# Patient Record
Sex: MALE | Race: WHITE | NOT HISPANIC OR LATINO | Employment: FULL TIME | URBAN - METROPOLITAN AREA
[De-identification: names, ages, dates, MRNs, and addresses within clinical notes are randomized per-mention and may not be internally consistent; named-entity substitution may affect disease eponyms.]

---

## 2019-03-12 ENCOUNTER — OFFICE VISIT (OUTPATIENT)
Dept: FAMILY MEDICINE CLINIC | Facility: CLINIC | Age: 34
End: 2019-03-12
Payer: COMMERCIAL

## 2019-03-12 VITALS
HEIGHT: 69 IN | HEART RATE: 77 BPM | DIASTOLIC BLOOD PRESSURE: 100 MMHG | BODY MASS INDEX: 33.03 KG/M2 | SYSTOLIC BLOOD PRESSURE: 130 MMHG | WEIGHT: 223 LBS | TEMPERATURE: 98 F | OXYGEN SATURATION: 97 %

## 2019-03-12 DIAGNOSIS — H01.02A SQUAMOUS BLEPHARITIS OF BOTH UPPER AND LOWER EYELID OF RIGHT EYE: Primary | ICD-10-CM

## 2019-03-12 DIAGNOSIS — K50.119 CROHN'S DISEASE OF LARGE INTESTINE WITH COMPLICATION (HCC): ICD-10-CM

## 2019-03-12 DIAGNOSIS — I10 ESSENTIAL HYPERTENSION: ICD-10-CM

## 2019-03-12 PROBLEM — K50.90 CROHN'S DISEASE (HCC): Status: ACTIVE | Noted: 2019-03-12

## 2019-03-12 PROCEDURE — 3008F BODY MASS INDEX DOCD: CPT | Performed by: FAMILY MEDICINE

## 2019-03-12 PROCEDURE — 99213 OFFICE O/P EST LOW 20 MIN: CPT | Performed by: FAMILY MEDICINE

## 2019-03-12 RX ORDER — TOBRAMYCIN AND DEXAMETHASONE 3; 1 MG/ML; MG/ML
1 SUSPENSION/ DROPS OPHTHALMIC
Qty: 5 ML | Refills: 1 | Status: SHIPPED | OUTPATIENT
Start: 2019-03-12 | End: 2021-10-07 | Stop reason: ALTCHOICE

## 2019-03-12 RX ORDER — INFLIXIMAB 100 MG/10ML
INJECTION, POWDER, LYOPHILIZED, FOR SOLUTION INTRAVENOUS
COMMUNITY

## 2019-03-12 NOTE — PROGRESS NOTES
Assessment/Plan:       Problem List Items Addressed This Visit        Digestive    Crohn's disease [facility])     Patient has longstanding Crohn's disease he is going in for Remicade infusions regularly and will have his blood pressure recheck that that time if it remains elevated will address with further workup  Cardiovascular and Mediastinum    Essential hypertension     Essential hypertension re-evaluate avoid sodium and follow-up in 3 months if still elevated will address this with potential medication as he has a family history of hypertension  He currently smokes I recommend stop smoking at this point he understands this we will re-evaluate his blood pressure in 3 months            Other    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 - Primary     Conjunctival hyperemia with swelling and erythema of both eyelids right eye present x3 days worsening since a piece of wood hit him in the eye  At this point we will prescribed TobraDex eyedrops and he will call back in 2 days if not improved will refer to Ophthalmology         Relevant Medications    tobramycin-dexamethasone (TOBRADEX) ophthalmic suspension            Subjective:      Patient ID: Deidra Mayes is a 26 y o  male  Patient presents today for swollen right eye no trauma to the area it has been itching and painful since Saturday night for the past 3 days now worsening  He has coming in to have evaluation and treatment for this at this time  Additionally noted at that his blood pressure is elevated likely due to stress and pain  Patient was cutting wood on Saturday and felt a piece of wood his eye      The following portions of the patient's history were reviewed and updated as appropriate: allergies, current medications, past family history, past medical history, past social history, past surgical history and problem list     Review of Systems   Constitutional: Negative for chills, fatigue and fever     HENT: Negative for congestion, nosebleeds, rhinorrhea, sinus pressure and sore throat  Eyes: Negative for discharge and redness  Respiratory: Negative for cough and shortness of breath  Cardiovascular: Negative for chest pain, palpitations and leg swelling  Gastrointestinal: Negative for abdominal pain, blood in stool and nausea  Endocrine: Negative for cold intolerance, heat intolerance and polyuria  Genitourinary: Negative for dysuria and frequency  Musculoskeletal: Negative for arthralgias, back pain and myalgias  Skin: Negative for rash  Neurological: Negative for dizziness, weakness and headaches  Hematological: Negative for adenopathy  Psychiatric/Behavioral: Negative for behavioral problems and sleep disturbance  The patient is not nervous/anxious  Objective:      /100 (BP Location: Left arm, Patient Position: Sitting)   Pulse 77   Temp 98 °F (36 7 °C) (Tympanic)   Ht 5' 9" (1 753 m)   Wt 101 kg (223 lb)   SpO2 97%   BMI 32 93 kg/m²        Physical Exam   Constitutional: He is oriented to person, place, and time  He appears well-developed and well-nourished  HENT:   Head: Normocephalic and atraumatic  Right Ear: External ear normal    Left Ear: External ear normal    Nose: Nose normal    Mouth/Throat: Oropharynx is clear and moist    Eyes: Pupils are equal, round, and reactive to light  Conjunctivae and EOM are normal  No scleral icterus  Right eye inflamed hyperemic conjunctiva with swelling periorbital and over both eyelids   Neck: Normal range of motion  Neck supple  No JVD present  No thyromegaly present  Cardiovascular: Normal rate, regular rhythm and normal heart sounds  No murmur heard  Pulmonary/Chest: Effort normal and breath sounds normal  He has no wheezes  He has no rales  He exhibits no tenderness  Abdominal: Soft  Bowel sounds are normal  He exhibits no distension and no mass  There is no tenderness  There is no rebound and no guarding  Musculoskeletal: Normal range of motion  He exhibits no edema, tenderness or deformity  Lymphadenopathy:     He has no cervical adenopathy  Neurological: He is alert and oriented to person, place, and time  He has normal reflexes  He displays normal reflexes  No cranial nerve deficit  Skin: Skin is warm and dry  No rash noted  No erythema  Psychiatric: He has a normal mood and affect  His behavior is normal  Judgment and thought content normal    Nursing note and vitals reviewed         Data:    Laboratory Results:   Radiology/Other Diagnostic Testing Results:      No results found for: WBC, HGB, HCT, MCV, PLT  No results found for: NA, K, CL, CO2, ANIONGAP, BUN, CREATININE, GLUCOSE, GLUF, CALCIUM, CORRECTEDCA, AST, ALT, ALKPHOS, PROT, BILITOT, EGFR  No results found for: CHOLESTEROL  No results found for: HDL  No results found for: LDLCALC  No results found for: TRIG  No results found for: CHOLHDL  No results found for: [id number], TSH  No results found for: HGBA1C  No results found for: PSA    [facility], DO

## 2019-03-12 NOTE — ASSESSMENT & PLAN NOTE
Essential hypertension re-evaluate avoid sodium and follow-up in 3 months if still elevated will address this with potential medication as he has a family history of hypertension    He currently smokes I recommend stop smoking at this point he understands this we will re-evaluate his blood pressure in 3 months

## 2019-03-12 NOTE — ASSESSMENT & PLAN NOTE
Conjunctival hyperemia with swelling and erythema of both eyelids right eye present x3 days worsening since a piece of wood hit him in the eye    At this point we will prescribed TobraDex eyedrops and he will call back in 2 days if not improved will refer to Ophthalmology

## 2019-03-12 NOTE — ASSESSMENT & PLAN NOTE
Patient has longstanding Crohn's disease he is going in for Remicade infusions regularly and will have his blood pressure recheck that that time if it remains elevated will address with further workup

## 2019-03-12 NOTE — PATIENT INSTRUCTIONS
Hypertension   AMBULATORY CARE:   Hypertension  is high blood pressure (BP)  Your BP is the force of your blood moving against the walls of your arteries  Normal BP is less than 120/80  Prehypertension is between 120/80 and 139/89  Hypertension is 140/90 or higher  Hypertension causes your BP to get so high that your heart has to work much harder than normal  This can damage your heart  You can control hypertension with a healthy lifestyle or medicines  A controlled blood pressure helps protect your organs, such as your heart, lungs, brain, and kidneys  Common symptoms include the following:   · Headache     · Blurred vision     · Chest pain     · Dizziness or weakness     · Trouble breathing    · Nosebleeds  Call 911 for any of the following:   · You have discomfort in your chest that feels like squeezing, pressure, fullness, or pain  · You become confused or have difficulty speaking  · You suddenly feel lightheaded or have trouble breathing  · You have pain or discomfort in your back, neck, jaw, stomach, or arm  Seek care immediately if:   · You have a severe headache or vision loss  · You have weakness in an arm or leg  Contact your healthcare provider if:   · You feel faint, dizzy, confused, or drowsy  · You have been taking your BP medicine and your BP is still higher than your healthcare provider says it should be  · You have questions or concerns about your condition or care  Treatment for hypertension  may include medicine to lower your BP and lower your cholesterol level  A low cholesterol level helps prevent heart disease and makes it easier to control your blood pressure  You may also need to make lifestyle changes  Take your medicine exactly as directed  Manage hypertension:  Talk with your healthcare provider about these and other ways to manage hypertension:  · Check your BP at home  Sit and rest for 5 minutes before you take your BP   Extend your arm and support it on a flat surface  Your arm should be at the same level as your heart  Follow the directions that came with your BP monitor  If possible, take at least 2 BP readings each time  Take your BP at least twice a day at the same times each day, such as morning and evening  Keep a record of your BP readings and bring it to your follow-up visits  Ask your healthcare provider what your BP should be  · Limit sodium (salt) as directed  Too much sodium can affect your fluid balance  Check labels to find low-sodium or no-salt-added foods  Some low-sodium foods use potassium salts for flavor  Too much potassium can also cause health problems  Your healthcare provider will tell you how much sodium and potassium are safe for you to have in a day  He or she may recommend that you limit sodium to 2,300 mg a day  · Follow the meal plan recommended by your healthcare provider  A dietitian or your provider can give you more information on low-sodium plans or the DASH (Dietary Approaches to Stop Hypertension) eating plan  The DASH plan is low in sodium, unhealthy fats, and total fat  It is high in potassium, calcium, and fiber  · Exercise to maintain a healthy weight  Exercise at least 30 minutes per day, on most days of the week  This will help decrease your blood pressure  Ask your healthcare provider about the best exercise plan for you  · Decrease stress  This may help lower your BP  Learn ways to relax, such as deep breathing or listening to music  · Limit alcohol  Women should limit alcohol to 1 drink a day  Men should limit alcohol to 2 drinks a day  A drink of alcohol is 12 ounces of beer, 5 ounces of wine, or 1½ ounces of liquor  · Do not smoke  Nicotine and other chemicals in cigarettes and cigars can increase your BP and also cause lung damage  Ask your healthcare provider for information if you currently smoke and need help to quit  E-cigarettes or smokeless tobacco still contain nicotine  Talk to your healthcare provider before you use these products  · Manage any other health conditions you have  Health conditions such as diabetes can increase your risk for hypertension  Follow your healthcare provider's instructions and take all your medicines as directed  Follow up with your healthcare provider as directed: You will need to return to have your BP checked and to have other lab tests done  Write down your questions so you remember to ask them during your visits  © 2017 [employer] Information is for End User's use only and may not be sold, redistributed or otherwise used for commercial purposes  All illustrations and images included in CareNotes® are the copyrighted property of A D A M [employer] Inc  or [employer]  The above information is an  only  It is not intended as medical advice for individual conditions or treatments  Talk to your doctor, nurse or pharmacist before following any medical regimen to see if it is safe and effective for you

## 2019-07-22 ENCOUNTER — OFFICE VISIT (OUTPATIENT)
Dept: URGENT CARE | Facility: CLINIC | Age: 34
End: 2019-07-22
Payer: COMMERCIAL

## 2019-07-22 VITALS
TEMPERATURE: 98.7 F | WEIGHT: 212 LBS | HEART RATE: 72 BPM | RESPIRATION RATE: 16 BRPM | SYSTOLIC BLOOD PRESSURE: 142 MMHG | BODY MASS INDEX: 31.4 KG/M2 | DIASTOLIC BLOOD PRESSURE: 90 MMHG | OXYGEN SATURATION: 98 % | HEIGHT: 69 IN

## 2019-07-22 DIAGNOSIS — M54.42 ACUTE BILATERAL LOW BACK PAIN WITH LEFT-SIDED SCIATICA: Primary | ICD-10-CM

## 2019-07-22 PROCEDURE — 99213 OFFICE O/P EST LOW 20 MIN: CPT | Performed by: PHYSICIAN ASSISTANT

## 2019-07-22 RX ORDER — METHOCARBAMOL 500 MG/1
500 TABLET, FILM COATED ORAL 3 TIMES DAILY PRN
Qty: 21 TABLET | Refills: 0 | Status: SHIPPED | OUTPATIENT
Start: 2019-07-22 | End: 2020-07-22 | Stop reason: ALTCHOICE

## 2019-07-22 RX ORDER — PREDNISONE 50 MG/1
50 TABLET ORAL DAILY
Qty: 5 TABLET | Refills: 0 | Status: SHIPPED | OUTPATIENT
Start: 2019-07-22 | End: 2019-07-27

## 2019-07-22 NOTE — PROGRESS NOTES
[facility]        NAME: Srikanth Peter is a 37 y o  male  : 1985    MRN: [id number]  DATE: 2019  TIME: 7:15 PM    Assessment and Plan   Acute bilateral low back pain with left-sided sciatica [M54 42]  1  Acute bilateral low back pain with left-sided sciatica  predniSONE 50 mg tablet    methocarbamol (ROBAXIN) 500 mg tablet         Patient Instructions       Follow up with PCP in 3-5 days  Proceed to  ER if symptoms worsen  Chief Complaint     Chief Complaint   Patient presents with    Back Pain     lower back pain x 2 weeks, states he doesn't recall an injury but does have history of herniated discs  Works in construction  Does get shooting pain down L leg         History of Present Illness       47-year-old male presents for evaluation of low back pain  States symptoms began approximately 2 weeks ago  Patient does have a past medical history of sciatica and herniation in the lumbar spine  Patient states he has to see pain management this week however past to reschedule it  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  He states at the pain is in the lower back region today radiating down the left leg  Does not change with position  Denies saddle anesthesia or loss of bowel or bladder function  Review of Systems   Review of Systems   Constitutional: Negative for chills, fatigue and fever  HENT: Negative for congestion, ear pain, sinus pain, sore throat and trouble swallowing  Eyes: Negative for pain, discharge and redness  Respiratory: Negative for cough, chest tightness, shortness of breath and wheezing  Cardiovascular: Negative for chest pain, palpitations and leg swelling  Gastrointestinal: Negative for abdominal pain, diarrhea, nausea and vomiting  Musculoskeletal: Positive for back pain  Negative for arthralgias, joint swelling and myalgias  Skin: Negative for rash     Neurological: Negative for dizziness, weakness, numbness and headaches  Current Medications       Current Outpatient Medications:     inFLIXimab (REMICADE) 100 mg, Infuse into a venous catheter, Disp: , Rfl:     methocarbamol (ROBAXIN) 500 mg tablet, Take 1 tablet (500 mg total) by mouth 3 (three) times a day as needed for muscle spasms for up to 7 days, Disp: 21 tablet, Rfl: 0    predniSONE 50 mg tablet, Take 1 tablet (50 mg total) by mouth daily for 5 days, Disp: 5 tablet, Rfl: 0    tobramycin-dexamethasone (TOBRADEX) ophthalmic suspension, Administer 1 drop to the right eye every 4 (four) hours while awake (Patient not taking: Reported on 7/22/2019), Disp: 5 mL, Rfl: 1    Current Allergies     Allergies as of 07/22/2019    (No Known Allergies)            The following portions of the patient's history were reviewed and updated as appropriate: allergies, current medications, past family history, past medical history, past social history, past surgical history and problem list      Past Medical History:   Diagnosis Date    Crohn's disease ([facility] [address] )     Lumbar herniated disc        History reviewed  No pertinent surgical history  Family History   Problem Relation Age of Onset    Heart attack Father     Diabetes Father     Neuropathy Father     Kidney disease Father          Medications have been verified  Objective   /90   Pulse 72   Temp 98 7 °F (37 1 °C) (Temporal)   Resp 16   Ht 5' 9" (1 753 m)   Wt 96 2 kg (212 lb)   SpO2 98%   BMI 31 31 kg/m²        Physical Exam     Physical Exam   Constitutional: Vital signs are normal  He appears well-developed  No distress  Cardiovascular: Normal rate, regular rhythm, normal heart sounds and intact distal pulses  Pulmonary/Chest: Effort normal and breath sounds normal    Musculoskeletal:        Cervical back: Normal         Thoracic back: Normal         Lumbar back: He exhibits spasm     Positive straight leg

## 2019-07-25 ENCOUNTER — TRANSCRIBE ORDERS (OUTPATIENT)
Dept: ADMINISTRATIVE | Facility: HOSPITAL | Age: 34
End: 2019-07-25

## 2019-07-25 DIAGNOSIS — M54.17 LUMBOSACRAL RADICULOPATHY: Primary | ICD-10-CM

## 2019-08-02 ENCOUNTER — HOSPITAL ENCOUNTER (OUTPATIENT)
Dept: MRI IMAGING | Facility: HOSPITAL | Age: 34
Discharge: HOME | End: 2019-08-02
Attending: ANESTHESIOLOGY
Payer: COMMERCIAL

## 2019-08-02 DIAGNOSIS — M54.17 LUMBOSACRAL RADICULOPATHY: ICD-10-CM

## 2019-08-02 PROCEDURE — 72148 MRI LUMBAR SPINE W/O DYE: CPT

## 2020-07-21 RX ORDER — SODIUM CHLORIDE 9 MG/ML
20 INJECTION, SOLUTION INTRAVENOUS ONCE
Status: COMPLETED | OUTPATIENT
Start: 2020-07-22 | End: 2020-07-22

## 2020-07-21 RX ORDER — DIPHENHYDRAMINE HCL 25 MG
50 TABLET ORAL ONCE
Status: COMPLETED | OUTPATIENT
Start: 2020-07-22 | End: 2020-07-22

## 2020-07-21 RX ORDER — ACETAMINOPHEN 325 MG/1
650 TABLET ORAL ONCE
Status: COMPLETED | OUTPATIENT
Start: 2020-07-22 | End: 2020-07-22

## 2020-07-22 ENCOUNTER — HOSPITAL ENCOUNTER (OUTPATIENT)
Dept: INFUSION CENTER | Facility: HOSPITAL | Age: 35
Discharge: HOME | End: 2020-07-22
Payer: COMMERCIAL

## 2020-07-22 VITALS
HEART RATE: 71 BPM | SYSTOLIC BLOOD PRESSURE: 131 MMHG | DIASTOLIC BLOOD PRESSURE: 77 MMHG | OXYGEN SATURATION: 96 % | RESPIRATION RATE: 18 BRPM | TEMPERATURE: 98.2 F | WEIGHT: 209.22 LBS | BODY MASS INDEX: 30.9 KG/M2

## 2020-07-22 PROCEDURE — 96365 THER/PROPH/DIAG IV INF INIT: CPT

## 2020-07-22 PROCEDURE — 96366 THER/PROPH/DIAG IV INF ADDON: CPT

## 2020-07-22 RX ADMIN — SODIUM CHLORIDE 20 ML/HR: 0.9 INJECTION, SOLUTION INTRAVENOUS at 08:45

## 2020-07-22 RX ADMIN — ACETAMINOPHEN 650 MG: 325 TABLET, FILM COATED ORAL at 08:44

## 2020-07-22 RX ADMIN — DIPHENHYDRAMINE HYDROCHLORIDE 50 MG: 25 TABLET ORAL at 08:44

## 2020-07-22 RX ADMIN — INFLIXIMAB 500 MG: 100 INJECTION, POWDER, LYOPHILIZED, FOR SOLUTION INTRAVENOUS at 09:32

## 2020-08-19 ENCOUNTER — APPOINTMENT (OUTPATIENT)
Dept: LAB | Facility: HOSPITAL | Age: 35
End: 2020-08-19
Payer: COMMERCIAL

## 2020-08-19 ENCOUNTER — TRANSCRIBE ORDERS (OUTPATIENT)
Dept: ADMINISTRATIVE | Facility: HOSPITAL | Age: 35
End: 2020-08-19

## 2020-08-19 DIAGNOSIS — R19.7 DIARRHEA, UNSPECIFIED TYPE: Primary | ICD-10-CM

## 2020-08-19 DIAGNOSIS — K50.919 CROHN'S DISEASE WITH COMPLICATION, UNSPECIFIED GASTROINTESTINAL TRACT LOCATION (HCC): ICD-10-CM

## 2020-08-19 DIAGNOSIS — R19.7 DIARRHEA, UNSPECIFIED TYPE: ICD-10-CM

## 2020-08-19 DIAGNOSIS — K51.919 MILD CHRONIC ULCERATIVE COLITIS WITH COMPLICATION (HCC): ICD-10-CM

## 2020-08-19 LAB
ALBUMIN SERPL BCP-MCNC: 4.3 G/DL (ref 3.4–4.8)
ALP SERPL-CCNC: 87.9 U/L (ref 10–129)
ALT SERPL W P-5'-P-CCNC: 23 U/L (ref 5–63)
ANION GAP SERPL CALCULATED.3IONS-SCNC: 8 MMOL/L (ref 4–13)
AST SERPL W P-5'-P-CCNC: 19 U/L (ref 15–41)
BASOPHILS # BLD AUTO: 0.04 THOUSANDS/ΜL (ref 0–0.1)
BASOPHILS NFR BLD AUTO: 1 % (ref 0–1)
BILIRUB DIRECT SERPL-MCNC: 0.14 MG/DL (ref 0–0.3)
BILIRUB SERPL-MCNC: 0.59 MG/DL (ref 0.3–1.2)
BUN SERPL-MCNC: 11 MG/DL (ref 6–20)
CALCIUM SERPL-MCNC: 9.3 MG/DL (ref 8.4–10.2)
CHLORIDE SERPL-SCNC: 106 MMOL/L (ref 96–108)
CO2 SERPL-SCNC: 25 MMOL/L (ref 22–33)
CREAT SERPL-MCNC: 0.72 MG/DL (ref 0.5–1.2)
CRP SERPL QL: 0.7 MG/L (ref 0–1)
EOSINOPHIL # BLD AUTO: 0.22 THOUSAND/ΜL (ref 0–0.61)
EOSINOPHIL NFR BLD AUTO: 3 % (ref 0–6)
ERYTHROCYTE [DISTWIDTH] IN BLOOD BY AUTOMATED COUNT: 12.2 % (ref 11.6–15.1)
ERYTHROCYTE [SEDIMENTATION RATE] IN BLOOD: 14 MM/HOUR (ref 0–15)
GFR SERPL CREATININE-BSD FRML MDRD: 119 ML/MIN/1.73SQ M
GLUCOSE SERPL-MCNC: 87 MG/DL (ref 65–140)
HCT VFR BLD AUTO: 47.3 % (ref 36.5–49.3)
HGB BLD-MCNC: 16.8 G/DL (ref 12–17)
IMM GRANULOCYTES # BLD AUTO: 0.04 THOUSAND/UL (ref 0–0.2)
IMM GRANULOCYTES NFR BLD AUTO: 1 % (ref 0–2)
LYMPHOCYTES # BLD AUTO: 2.59 THOUSANDS/ΜL (ref 0.6–4.47)
LYMPHOCYTES NFR BLD AUTO: 35 % (ref 14–44)
MCH RBC QN AUTO: 32 PG (ref 26.8–34.3)
MCHC RBC AUTO-ENTMCNC: 35.5 G/DL (ref 31.4–37.4)
MCV RBC AUTO: 90 FL (ref 82–98)
MONOCYTES # BLD AUTO: 0.73 THOUSAND/ΜL (ref 0.17–1.22)
MONOCYTES NFR BLD AUTO: 10 % (ref 4–12)
NEUTROPHILS # BLD AUTO: 3.75 THOUSANDS/ΜL (ref 1.85–7.62)
NEUTS SEG NFR BLD AUTO: 50 % (ref 43–75)
PLATELET # BLD AUTO: 221 THOUSANDS/UL (ref 149–390)
PMV BLD AUTO: 9.3 FL (ref 8.9–12.7)
POTASSIUM SERPL-SCNC: 4.1 MMOL/L (ref 3.5–5)
PROT SERPL-MCNC: 7.5 G/DL (ref 6.4–8.3)
RBC # BLD AUTO: 5.25 MILLION/UL (ref 3.88–5.62)
SODIUM SERPL-SCNC: 139 MMOL/L (ref 133–145)
WBC # BLD AUTO: 7.37 THOUSAND/UL (ref 4.31–10.16)

## 2020-08-19 PROCEDURE — 86140 C-REACTIVE PROTEIN: CPT

## 2020-08-19 PROCEDURE — 85652 RBC SED RATE AUTOMATED: CPT

## 2020-08-19 PROCEDURE — 85025 COMPLETE CBC W/AUTO DIFF WBC: CPT

## 2020-08-19 PROCEDURE — 86480 TB TEST CELL IMMUN MEASURE: CPT

## 2020-08-19 PROCEDURE — 82248 BILIRUBIN DIRECT: CPT

## 2020-08-19 PROCEDURE — 80053 COMPREHEN METABOLIC PANEL: CPT

## 2020-08-19 PROCEDURE — 36415 COLL VENOUS BLD VENIPUNCTURE: CPT

## 2020-08-21 LAB
GAMMA INTERFERON BACKGROUND BLD IA-ACNC: 0.03 IU/ML
M TB IFN-G BLD-IMP: NEGATIVE
M TB IFN-G CD4+ BCKGRND COR BLD-ACNC: 0 IU/ML
M TB IFN-G CD4+ BCKGRND COR BLD-ACNC: 0 IU/ML
MITOGEN IGNF BCKGRD COR BLD-ACNC: >10 IU/ML

## 2020-09-02 ENCOUNTER — HOSPITAL ENCOUNTER (OUTPATIENT)
Dept: INFUSION CENTER | Facility: HOSPITAL | Age: 35
Discharge: HOME | End: 2020-09-02

## 2021-01-08 NOTE — PROGRESS NOTES
Received phone call from [facility] 65 regarding patient's Remicade infusions requesting infusion order is changed to infuse over 2 hours instead of every 3 as previously ordered  Addendum made to paper Rx and faxed to [employer] at [phone]

## 2021-08-26 ENCOUNTER — PREP FOR PROCEDURE (OUTPATIENT)
Dept: GASTROENTEROLOGY | Facility: CLINIC | Age: 36
End: 2021-08-26

## 2021-08-26 DIAGNOSIS — K50.10 CROHN'S DISEASE OF LARGE INTESTINE WITHOUT COMPLICATION (HCC): ICD-10-CM

## 2021-08-26 DIAGNOSIS — Z86.010 HISTORY OF COLON POLYPS: Primary | ICD-10-CM

## 2021-10-07 ENCOUNTER — ANESTHESIA EVENT (OUTPATIENT)
Dept: GASTROENTEROLOGY | Facility: AMBULATORY SURGERY CENTER | Age: 36
End: 2021-10-07

## 2021-10-07 ENCOUNTER — HOSPITAL ENCOUNTER (OUTPATIENT)
Dept: GASTROENTEROLOGY | Facility: AMBULATORY SURGERY CENTER | Age: 36
Discharge: HOME | End: 2021-10-07
Payer: COMMERCIAL

## 2021-10-07 ENCOUNTER — ANESTHESIA (OUTPATIENT)
Dept: GASTROENTEROLOGY | Facility: AMBULATORY SURGERY CENTER | Age: 36
End: 2021-10-07

## 2021-10-07 ENCOUNTER — TELEPHONE (OUTPATIENT)
Dept: GASTROENTEROLOGY | Facility: CLINIC | Age: 36
End: 2021-10-07

## 2021-10-07 VITALS
HEIGHT: 69 IN | WEIGHT: 210 LBS | OXYGEN SATURATION: 100 % | DIASTOLIC BLOOD PRESSURE: 82 MMHG | TEMPERATURE: 98.4 F | SYSTOLIC BLOOD PRESSURE: 127 MMHG | HEART RATE: 63 BPM | BODY MASS INDEX: 31.1 KG/M2 | RESPIRATION RATE: 18 BRPM

## 2021-10-07 DIAGNOSIS — K50.10 CROHN'S DISEASE OF LARGE INTESTINE WITHOUT COMPLICATION (HCC): ICD-10-CM

## 2021-10-07 DIAGNOSIS — Z86.010 HISTORY OF COLON POLYPS: ICD-10-CM

## 2021-10-07 PROCEDURE — 88305 TISSUE EXAM BY PATHOLOGIST: CPT | Performed by: PATHOLOGY

## 2021-10-07 PROCEDURE — 00811 ANES LWR INTST NDSC NOS: CPT | Performed by: NURSE ANESTHETIST, CERTIFIED REGISTERED

## 2021-10-07 PROCEDURE — 45380 COLONOSCOPY AND BIOPSY: CPT | Performed by: INTERNAL MEDICINE

## 2021-10-07 PROCEDURE — 45385 COLONOSCOPY W/LESION REMOVAL: CPT | Performed by: INTERNAL MEDICINE

## 2021-10-07 RX ORDER — SODIUM CHLORIDE 9 MG/ML
INJECTION, SOLUTION INTRAVENOUS CONTINUOUS PRN
Status: DISCONTINUED | OUTPATIENT
Start: 2021-10-07 | End: 2021-10-07

## 2021-10-07 RX ORDER — SODIUM CHLORIDE 9 MG/ML
125 INJECTION, SOLUTION INTRAVENOUS CONTINUOUS
Status: DISCONTINUED | OUTPATIENT
Start: 2021-10-07 | End: 2021-10-11 | Stop reason: HOSPADM

## 2021-10-07 RX ORDER — PROPOFOL 10 MG/ML
INJECTION, EMULSION INTRAVENOUS AS NEEDED
Status: DISCONTINUED | OUTPATIENT
Start: 2021-10-07 | End: 2021-10-07

## 2021-10-07 RX ADMIN — PROPOFOL 150 MG: 10 INJECTION, EMULSION INTRAVENOUS at 12:21

## 2021-10-07 RX ADMIN — SODIUM CHLORIDE: 9 INJECTION, SOLUTION INTRAVENOUS at 12:24

## 2021-10-07 RX ADMIN — PROPOFOL 50 MG: 10 INJECTION, EMULSION INTRAVENOUS at 12:28

## 2021-10-07 RX ADMIN — PROPOFOL 50 MG: 10 INJECTION, EMULSION INTRAVENOUS at 12:27

## 2021-10-21 ENCOUNTER — TELEPHONE (OUTPATIENT)
Dept: GASTROENTEROLOGY | Facility: CLINIC | Age: 36
End: 2021-10-21

## 2022-03-09 ENCOUNTER — DOCUMENTATION (OUTPATIENT)
Dept: GASTROENTEROLOGY | Facility: CLINIC | Age: 37
End: 2022-03-09

## 2022-10-24 ENCOUNTER — OFFICE VISIT (OUTPATIENT)
Dept: URGENT CARE | Facility: CLINIC | Age: 37
End: 2022-10-24
Payer: COMMERCIAL

## 2022-10-24 VITALS
HEART RATE: 62 BPM | HEIGHT: 69 IN | DIASTOLIC BLOOD PRESSURE: 78 MMHG | OXYGEN SATURATION: 97 % | RESPIRATION RATE: 16 BRPM | TEMPERATURE: 98 F | SYSTOLIC BLOOD PRESSURE: 138 MMHG | BODY MASS INDEX: 28.44 KG/M2 | WEIGHT: 192 LBS

## 2022-10-24 DIAGNOSIS — J20.9 ACUTE BRONCHITIS, UNSPECIFIED ORGANISM: Primary | ICD-10-CM

## 2022-10-24 PROCEDURE — 99213 OFFICE O/P EST LOW 20 MIN: CPT | Performed by: PHYSICIAN ASSISTANT

## 2022-10-24 RX ORDER — ALBUTEROL SULFATE 90 UG/1
2 AEROSOL, METERED RESPIRATORY (INHALATION) EVERY 6 HOURS PRN
Qty: 8 G | Refills: 0 | Status: SHIPPED | OUTPATIENT
Start: 2022-10-24

## 2022-10-24 RX ORDER — AZITHROMYCIN 250 MG/1
TABLET, FILM COATED ORAL
Qty: 6 TABLET | Refills: 0 | Status: SHIPPED | OUTPATIENT
Start: 2022-10-24 | End: 2022-10-28

## 2022-10-24 NOTE — PROGRESS NOTES
[id number]        NAME: Jeanie Collet is a 42 y o  male  : 1985    MRN: [id number]  DATE: 2022  TIME: 5:00 PM    Assessment and Plan   Acute bronchitis, unspecified organism [J20 9]  1  Acute bronchitis, unspecified organism  azithromycin (ZITHROMAX) 250 mg tablet    albuterol (Proventil HFA) 90 mcg/act inhaler         Patient Instructions       Follow up with PCP in 3-5 days  Proceed to  ER if symptoms worsen  Chief Complaint     Chief Complaint   Patient presents with   • Cough     1 week persistent cough W/ a lot of mucus  Mucinex  History of Present Illness       Cough  This is a new problem  The current episode started 1 to 4 weeks ago  The problem has been waxing and waning  The problem occurs constantly  The cough is productive of purulent sputum  Associated symptoms include shortness of breath and wheezing  Pertinent negatives include no chest pain, chills, fever, headaches, myalgias or rhinorrhea  The symptoms are aggravated by lying down  He has tried OTC cough suppressant for the symptoms  The treatment provided no relief  There is no history of asthma, COPD or emphysema  Review of Systems   Review of Systems   Constitutional: Negative for chills and fever  HENT: Negative for rhinorrhea  Respiratory: Positive for cough, shortness of breath and wheezing  Cardiovascular: Negative for chest pain  Musculoskeletal: Negative for myalgias  Neurological: Negative for headaches           Current Medications       Current Outpatient Medications:   •  albuterol (Proventil HFA) 90 mcg/act inhaler, Inhale 2 puffs every 6 (six) hours as needed for wheezing or shortness of breath, Disp: 8 g, Rfl: 0  •  azithromycin (ZITHROMAX) 250 mg tablet, Take 2 tablets today then 1 tablet daily x 4 days, Disp: 6 tablet, Rfl: 0  •  inFLIXimab (REMICADE) 100 mg, Infuse into a venous catheter, Disp: , Rfl:     Current Allergies     Allergies as of 10/24/2022   • (No Known Allergies)            The following portions of the patient's history were reviewed and updated as appropriate: allergies, current medications, past family history, past medical history, past social history, past surgical history and problem list      Past Medical History:   Diagnosis Date   • Colon polyp    • Crohn's disease ([address] )    • GERD (gastroesophageal reflux disease)     occasion   • History of Crohn's disease    • Lumbar herniated disc        Past Surgical History:   Procedure Laterality Date   • BACK SURGERY      herniated disc surgery   • COLONOSCOPY     • WISDOM TOOTH EXTRACTION         Family History   Problem Relation Age of Onset   • Heart attack Father    • Diabetes Father    • Neuropathy Father    • Kidney disease Father    • Colon cancer Maternal Uncle          Medications have been verified          Objective   /78   Pulse 62   Temp 98 °F (36 7 °C)   Resp 16   Ht 5' 9" (1 753 m)   Wt 87 1 kg (192 lb)   SpO2 97%   BMI 28 35 kg/m²        Physical Exam     Physical Exam

## 2022-10-24 NOTE — PATIENT INSTRUCTIONS
Acute Bronchitis   WHAT YOU NEED TO KNOW:   Acute bronchitis is swelling and irritation in your lungs  It is usually caused by a virus and most often happens in the winter  Bronchitis may also be caused by bacteria or by a chemical irritant, such as smoke  DISCHARGE INSTRUCTIONS:   Return to the emergency department if:   You cough up blood  Your lips or fingernails turn blue  You feel like you are not getting enough air when you breathe  Call your doctor if:   Your symptoms do not go away or get worse, even after treatment  Your cough does not get better within 4 weeks  You have questions or concerns about your condition or care  Medicines: You may  need any of the following:  Cough suppressants  decrease your urge to cough  Decongestants  help loosen mucus in your lungs and make it easier to cough up  This can help you breathe easier  Inhalers  may be given  Your healthcare provider may give you one or more inhalers to help you breathe easier and cough less  An inhaler gives your medicine to open your airways  Ask your healthcare provider to show you how to use your inhaler correctly  Antibiotics  may be given for up to 5 days if your bronchitis is caused by bacteria  Acetaminophen  decreases pain and fever  It is available without a doctor's order  Ask how much to take and how often to take it  Follow directions  Read the labels of all other medicines you are using to see if they also contain acetaminophen, or ask your doctor or pharmacist  Acetaminophen can cause liver damage if not taken correctly  Do not use more than 4 grams (4,000 milligrams) total of acetaminophen in one day  NSAIDs  help decrease swelling and pain or fever  This medicine is available with or without a doctor's order  NSAIDs can cause stomach bleeding or kidney problems in certain people  If you take blood thinner medicine, always ask your healthcare provider if NSAIDs are safe for you   Always read the medicine label and follow directions  Take your medicine as directed  Contact your healthcare provider if you think your medicine is not helping or if you have side effects  Tell him of her if you are allergic to any medicine  Keep a list of the medicines, vitamins, and herbs you take  Include the amounts, and when and why you take them  Bring the list or the pill bottles to follow-up visits  Carry your medicine list with you in case of an emergency  Self-care:   Drink liquids as directed  You may need to drink more liquids than usual to stay hydrated  Ask how much liquid to drink each day and which liquids are best for you  Use a cool mist humidifier  to increase air moisture in your home  This may make it easier for you to breathe and help decrease your cough  Get more rest   Rest helps your body to heal  Slowly start to do more each day  Rest when you feel it is needed  Avoid irritants in the air  Avoid chemicals, fumes, and dust  Wear a face mask if you must work around dust or fumes  Stay inside on days when air pollution levels are high  If you have allergies, stay inside when pollen counts are high  Do not use aerosol products, such as spray-on deodorant, bug spray, and hair spray  Do not smoke or be around others who are smoking  Nicotine and other chemicals in cigarettes and cigars can cause lung damage  Ask your healthcare provider for information if you currently smoke and need help to quit  E-cigarettes or smokeless tobacco still contain nicotine  Talk to your healthcare provider before you use these products  Prevent acute bronchitis:       Ask about vaccines you may need  Get a flu vaccine each year as soon as recommended, usually in September or October  Ask your healthcare provider if you should also get a pneumonia or COVID-19 vaccine  Your healthcare provider can tell you if you should also get other vaccines, and when to get them  Prevent the spread of germs    You can decrease your risk for acute bronchitis and other illnesses by doing the following:     Wash your hands often with soap and water  Carry germ-killing hand lotion or gel with you  You can use the lotion or gel to clean your hands when soap and water are not available  Do not touch your eyes, nose, or mouth unless you have washed your hands first     Always cover your mouth when you cough to prevent the spread of germs  It is best to cough into a tissue or your shirt sleeve instead of into your hand  Ask those around you to cover their mouths when they cough  Try to avoid people who have a cold or the flu  If you are sick, stay away from others as much as possible  Follow up with your doctor as directed:  Write down questions you have so you will remember to ask them during your follow-up visits  © Copyright [employer] 2022 Information is for End User's use only and may not be sold, redistributed or otherwise used for commercial purposes  All illustrations and images included in CareNotes® are the copyrighted property of [employer]  or Brooke Booker   The above information is an  only  It is not intended as medical advice for individual conditions or treatments  Talk to your doctor, nurse or pharmacist before following any medical regimen to see if it is safe and effective for you

## 2022-11-22 ENCOUNTER — OFFICE VISIT (OUTPATIENT)
Dept: GASTROENTEROLOGY | Facility: CLINIC | Age: 37
End: 2022-11-22

## 2022-11-22 VITALS
DIASTOLIC BLOOD PRESSURE: 96 MMHG | WEIGHT: 207.8 LBS | BODY MASS INDEX: 30.78 KG/M2 | HEART RATE: 70 BPM | HEIGHT: 69 IN | SYSTOLIC BLOOD PRESSURE: 154 MMHG

## 2022-11-22 DIAGNOSIS — K50.10 CROHN'S DISEASE OF LARGE INTESTINE WITHOUT COMPLICATION (HCC): Primary | ICD-10-CM

## 2022-11-22 NOTE — PROGRESS NOTES
[facility] - Outpatient Follow-up Note  Norma Aguero 42 y o  male MRN: [id number]  Encounter: [id number]          ASSESSMENT AND PLAN:      1  Crohn's disease of large intestine without complication (HCC)  -     CBC and differential; Future  -     Comprehensive metabolic panel; Future  -     C-reactive protein; Future  -     Calprotectin,Fecal; Future  -     Giardia antigen; Future  -     Clostridium difficile toxin by PCR; Future  -     INFLIXIMAB LEVEL AND ANTI-DRUG ANTIBODY FOR IBD ([facility]); Future  -     INFLIXIMAB LEVEL AND ANTI-DRUG ANTIBODY FOR IBD ([facility])      History of Crohn's colitis, recent colonoscopy biopsy results noted, had some erythema in the transverse and rectal area, clinically was doing rather well on infliximab infusions 10 milligram/kilogram, recent symptoms of her diarrhea several times a day with urgency cramping, infecting his quality of life, is in construction works outdoors  Had a long discussion with patient and his wife, this could be suboptimal response to reflux mm on maximum dose  Sometimes antibody formation can present like this as well  Will check labs, stool studies, and infliximab antibody  He will probably need to be switched to another biologic/anti TNF drug  DC in pending above information  Complications of Crohn's discussed  ______________________________________________________________________    SUBJECTIVE:      Patient came in for follow-up and evaluation of his longstanding history of Crohn's disease, diagnosed in his teenage years around 14-20  He has been on Remicade infusions for more than 15 years and has been doing well  For the last several months he has noticed more frequent bowel movements, sometimes 8-10 times on a bad day  Denies any blood in stools, does have some urgency cramping with BMs  No reviewed and took Zithromax couple months ago but these diarrhea symptoms has been there before that    No travel no known no one else around him sick  Appetite is fair weight stable energy level so-so has some arthralgias  Denies any dysphagia coughing choking spells, does not bleed or bruise easy  Diet medications more than 10 pertinent systems were reviewed      REVIEW OF SYSTEMS IS OTHERWISE NEGATIVE  Historical Information   Past Medical History:   Diagnosis Date   • Colon polyp    • Crohn's disease ([facility] [address] )    • GERD (gastroesophageal reflux disease)     occasion   • History of Crohn's disease    • Lumbar herniated disc      Past Surgical History:   Procedure Laterality Date   • BACK SURGERY      herniated disc surgery   • COLONOSCOPY     • WISDOM TOOTH EXTRACTION       Social History   Social History     Substance and Sexual Activity   Alcohol Use Yes    Comment: occasional     Social History     Substance and Sexual Activity   Drug Use Not Currently     Social History     Tobacco Use   Smoking Status Every Day   • Packs/day: 1 00   • Types: Cigarettes   Smokeless Tobacco Never     Family History   Problem Relation Age of Onset   • Heart attack Father    • Diabetes Father    • Neuropathy Father    • Kidney disease Father    • Colon cancer Maternal Uncle        Meds/Allergies       Current Outpatient Medications:   •  albuterol (Proventil HFA) 90 mcg/act inhaler  •  inFLIXimab (REMICADE) 100 mg    No Known Allergies        Objective     Blood pressure 154/96, pulse 70, height 5' 9" (1 753 m), weight 94 3 kg (207 lb 12 8 oz)  Body mass index is 30 69 kg/m²  PHYSICAL EXAM:      General Appearance:   Alert, cooperative, no distress   HEENT:   Normocephalic, atraumatic, anicteric  Neck:  Supple, symmetrical, trachea midline   Lungs:   Clear to auscultation bilaterally; no rales, rhonchi or wheezing; respirations unlabored    Heart[de-identified]   Regular rate and rhythm; no murmur     Abdomen:   Soft, non-tender, non-distended; normal bowel sounds; no masses, no organomegaly    Genitalia:   Deferred    Rectal:   Deferred    Extremities: No cyanosis, clubbing or edema    Skin:  No jaundice, rashes, or lesions    Lymph nodes:  No palpable cervical lymphadenopathy        Lab Results:   No visits with results within 1 Day(s) from this visit  Latest known visit with results is:   Hospital Outpatient Visit on 10/07/2021   Component Date Value   • Case Report 10/07/2021                      Value:Surgical Pathology Report                         Case: [id number]                                   Authorizing Provider:  Corinne Situ, MD           Collected:           10/07/2021 1232              Ordering Location:     [address] [place] [address] Received:            10/07/2021 2045                                     [place]                                                                  Pathologist:           Wilbur Manjarrez MD                                                         Specimens:   A) - Rectum, cold bx rectum ulcer hx of crohns                                                      B) - Large Intestine, Sigmoid Colon, cold snare mid sigmoid polyp                         • Final Diagnosis 10/07/2021                      Value: This result contains rich text formatting which cannot be displayed here  • Additional Information 10/07/2021                      Value: This result contains rich text formatting which cannot be displayed here  • Gross Description 10/07/2021                      Value: This result contains rich text formatting which cannot be displayed here  Radiology Results:   No results found

## 2022-11-23 ENCOUNTER — APPOINTMENT (OUTPATIENT)
Dept: LAB | Facility: CLINIC | Age: 37
End: 2022-11-23

## 2022-11-23 DIAGNOSIS — K50.10 CROHN'S DISEASE OF LARGE INTESTINE WITHOUT COMPLICATION (HCC): Primary | ICD-10-CM

## 2022-11-23 LAB
ALBUMIN SERPL BCP-MCNC: 3.5 G/DL (ref 3.5–5)
ALP SERPL-CCNC: 112 U/L (ref 46–116)
ALT SERPL W P-5'-P-CCNC: 38 U/L (ref 12–78)
ANION GAP SERPL CALCULATED.3IONS-SCNC: 8 MMOL/L (ref 4–13)
AST SERPL W P-5'-P-CCNC: 17 U/L (ref 5–45)
BASOPHILS # BLD AUTO: 0.05 THOUSANDS/ÂΜL (ref 0–0.1)
BASOPHILS NFR BLD AUTO: 0 % (ref 0–1)
BILIRUB SERPL-MCNC: 0.4 MG/DL (ref 0.2–1)
BUN SERPL-MCNC: 10 MG/DL (ref 5–25)
CALCIUM SERPL-MCNC: 9.7 MG/DL (ref 8.3–10.1)
CHLORIDE SERPL-SCNC: 109 MMOL/L (ref 96–108)
CO2 SERPL-SCNC: 23 MMOL/L (ref 21–32)
CREAT SERPL-MCNC: 0.81 MG/DL (ref 0.6–1.3)
CRP SERPL QL: 6 MG/L
EOSINOPHIL # BLD AUTO: 0.08 THOUSAND/ÂΜL (ref 0–0.61)
EOSINOPHIL NFR BLD AUTO: 1 % (ref 0–6)
ERYTHROCYTE [DISTWIDTH] IN BLOOD BY AUTOMATED COUNT: 11.8 % (ref 11.6–15.1)
GFR SERPL CREATININE-BSD FRML MDRD: 111 ML/MIN/1.73SQ M
GLUCOSE P FAST SERPL-MCNC: 108 MG/DL (ref 65–99)
HBV SURFACE AG SER QL: NORMAL
HCT VFR BLD AUTO: 44.9 % (ref 36.5–49.3)
HGB BLD-MCNC: 15.3 G/DL (ref 12–17)
IMM GRANULOCYTES # BLD AUTO: 0.05 THOUSAND/UL (ref 0–0.2)
IMM GRANULOCYTES NFR BLD AUTO: 0 % (ref 0–2)
LYMPHOCYTES # BLD AUTO: 3.4 THOUSANDS/ÂΜL (ref 0.6–4.47)
LYMPHOCYTES NFR BLD AUTO: 31 % (ref 14–44)
MCH RBC QN AUTO: 31.7 PG (ref 26.8–34.3)
MCHC RBC AUTO-ENTMCNC: 34.1 G/DL (ref 31.4–37.4)
MCV RBC AUTO: 93 FL (ref 82–98)
MONOCYTES # BLD AUTO: 0.71 THOUSAND/ÂΜL (ref 0.17–1.22)
MONOCYTES NFR BLD AUTO: 6 % (ref 4–12)
NEUTROPHILS # BLD AUTO: 6.83 THOUSANDS/ÂΜL (ref 1.85–7.62)
NEUTS SEG NFR BLD AUTO: 62 % (ref 43–75)
NRBC BLD AUTO-RTO: 0 /100 WBCS
PLATELET # BLD AUTO: 262 THOUSANDS/UL (ref 149–390)
PMV BLD AUTO: 9.4 FL (ref 8.9–12.7)
POTASSIUM SERPL-SCNC: 3.9 MMOL/L (ref 3.5–5.3)
PROT SERPL-MCNC: 8.3 G/DL (ref 6.4–8.4)
RBC # BLD AUTO: 4.82 MILLION/UL (ref 3.88–5.62)
SODIUM SERPL-SCNC: 140 MMOL/L (ref 135–147)
WBC # BLD AUTO: 11.12 THOUSAND/UL (ref 4.31–10.16)

## 2022-11-24 LAB
C DIFF TOX GENS STL QL NAA+PROBE: NEGATIVE
HBV SURFACE AB SER-ACNC: <3.1 MIU/ML

## 2022-11-25 LAB
G LAMBLIA AG STL QL IA: NEGATIVE
GAMMA INTERFERON BACKGROUND BLD IA-ACNC: 0.04 IU/ML
M TB IFN-G BLD-IMP: NEGATIVE
M TB IFN-G CD4+ BCKGRND COR BLD-ACNC: 0 IU/ML
M TB IFN-G CD4+ BCKGRND COR BLD-ACNC: 0 IU/ML
MITOGEN IGNF BCKGRD COR BLD-ACNC: >10 IU/ML

## 2022-11-29 LAB — CALPROTECTIN STL-MCNT: 411 UG/G (ref 0–120)

## 2023-03-15 RX ORDER — INFLIXIMAB 100 MG/10ML
INJECTION, POWDER, LYOPHILIZED, FOR SOLUTION INTRAVENOUS
Qty: 1 EACH | Status: CANCELLED | OUTPATIENT
Start: 2023-03-15

## 2023-03-15 NOTE — TELEPHONE ENCOUNTER
Patients significant other, Elizabeth Campbell, calling in regards to fax and prescription  Medication is due to be given on 3/17/23  Elizabeth Campbell and patient is asking for a phone call when the form is completed and faxed

## 2023-03-15 NOTE — TELEPHONE ENCOUNTER
Spoke to pt, he states [facility] does his infusions, which is odd since we have a contact there and they fax and call us all the time, not sure why we wouldn't have gotten any thing from them, but pt also states he thinks [facility], we had no documentation of this, I did send email out to [facility] Rep

## 2023-03-15 NOTE — TELEPHONE ENCOUNTER
Spoke to Lexie  She gave me Murphy Don at [facility] [phone] and I was able to call in a verbal for pts remicade

## 2023-03-15 NOTE — TELEPHONE ENCOUNTER
Patients GI provider:  Dr Tamera Vidal    Number to return call: [phone]    Reason for call: Pt's wife calling  Given correct Fax #  To [facility] for RX needing signature      Scheduled procedure/appointment date if applicable: Apt/procedure NA

## 2023-03-15 NOTE — TELEPHONE ENCOUNTER
Pablito Mcconnell called because the pharmacy states they have faxed a prescription for the pt remicade to be signed and approved by Dr Vogt Falling  She states she has called several times as well  The pt is due for his infusion on Friday and he gets them done at home  The medication needs to be shipped to the home before the nurse gets there on Friday   Please call either Pablito Mcconnell at [phone] or Larry Henriquez at [phone]

## 2023-07-27 ENCOUNTER — TELEPHONE (OUTPATIENT)
Age: 38
End: 2023-07-27

## 2023-07-27 NOTE — TELEPHONE ENCOUNTER
Last OV 11/22/22  Next OV 9/5 Dr. Manolo Carrizales    Hx Crohn's    Patient switched location from infusion center to [facility] over a year ago for Remicade infusions. Since the switch patient receiving Remicade every 6 weeks excatly. Last infusion 7/21. By week 4 after Remicade patient reports 1. Increase in bowel movement frequency from 5 x day to 6-7 x day with occasional blood on toilet paper  2. Extreme fatigue  3. Joint discomfort in knees and elbows  3. Mild lower abdominal pain that comes and goes. Recommended  Infliximab antibody level (still active from 11/22) to be drawn 1 day prior or morning of next Remicade infusion. Appointment scheduled for 9/5/23.

## 2023-07-27 NOTE — TELEPHONE ENCOUNTER
pts spouse called in regards to two questions. 1. Pt used to have his remicade infusion every 5-8 weeks and it wouldn't be exactly every 6 weeks. pts spouse stated this used to work better because ever since pt started getting his infusion every 6 weeks, he has been benefiting less and less each time. She stated that by the time of his infusion, pt is experiencing increased urgency, bms, and bleeding. Pt and spouse are hoping to have time frame adjusted. 2. Pt wife asked if pt can have raw milk. I advised against that.

## 2023-08-07 ENCOUNTER — OFFICE VISIT (OUTPATIENT)
Dept: GASTROENTEROLOGY | Facility: CLINIC | Age: 38
End: 2023-08-07
Payer: COMMERCIAL

## 2023-08-07 ENCOUNTER — TELEPHONE (OUTPATIENT)
Dept: GASTROENTEROLOGY | Facility: CLINIC | Age: 38
End: 2023-08-07

## 2023-08-07 VITALS
SYSTOLIC BLOOD PRESSURE: 138 MMHG | DIASTOLIC BLOOD PRESSURE: 82 MMHG | HEIGHT: 69 IN | WEIGHT: 198 LBS | HEART RATE: 79 BPM | BODY MASS INDEX: 29.33 KG/M2

## 2023-08-07 DIAGNOSIS — K50.10 CROHN'S DISEASE OF LARGE INTESTINE WITHOUT COMPLICATION (HCC): Primary | ICD-10-CM

## 2023-08-07 DIAGNOSIS — Z86.010 HISTORY OF COLON POLYPS: ICD-10-CM

## 2023-08-07 DIAGNOSIS — Z79.899 ENCOUNTER FOR LONG-TERM (CURRENT) USE OF MEDICATIONS: ICD-10-CM

## 2023-08-07 PROCEDURE — 99214 OFFICE O/P EST MOD 30 MIN: CPT | Performed by: INTERNAL MEDICINE

## 2023-08-07 RX ORDER — POLYETHYLENE GLYCOL-3350 AND ELECTROLYTES 236; 6.74; 5.86; 2.97; 22.74 G/274.31G; G/274.31G; G/274.31G; G/274.31G; G/274.31G
4 POWDER, FOR SOLUTION ORAL ONCE
Qty: 4000 ML | Refills: 0 | Status: SHIPPED | OUTPATIENT
Start: 2023-08-07 | End: 2023-08-19 | Stop reason: SDUPTHER

## 2023-08-07 NOTE — TELEPHONE ENCOUNTER
[address] [facility],[address] Assessment    Name: Francisco Tobias  YOB: 1985  Last Height: 5' 9" (1.753 m)  Last weight: 89.8 kg (198 lb)  BMI: 29.24 kg/m²  Procedure: Colon  Diagnosis:   Date of procedure: 08/22/23  Prep: Golytely  Responsible : Abran Bourne  Phone#: [phone]  Name completing form: Opal Barnhart  Date form completed: 08/07/23      If the patient answers yes to any of these questions, schedule in a hospital  Are you pregnant: No  Do you rely on a wheelchair for mobility: No  Have you been diagnosed with End Stage Renal Disease (ESRD): No  Do you need oxygen during the day: No  Have you had a heart attack or stroke within the past three months: No  Have you had a seizure within the past three months: No  Have you ever been informed by anesthesia that you have a difficult airway: No  Additional Questions  Have you had any cardiac testing or are under the care of a Cardiologist (see cardiac list): No  Cardiac list:   Do you have an implanted cardiac defibrillator: No (Comment:  This patient should be scheduled in the hospital)    Have any bleeding problems, such as anemia or hemophilia (If patient has H&H result below 8, schedule in hospital.  H&H must be within 30 days of procedure): No    Had an organ transplant within the past 3 months: No    Do you have any present infections: No  Do you get short of breath when walking a few blocks: No  Have you been diagnosed with diabetes: No  Comments (provide cardiac provider information if applicable):

## 2023-08-07 NOTE — PROGRESS NOTES
[facility] - Outpatient Follow-up Note  Ngozi Viera 34 y.o. male MRN: [id number]  Encounter: [id number]          ASSESSMENT AND PLAN:      1. Crohn's disease of large intestine without complication ([address])  On chronic infliximab for about 15+ years. Patient was diagnosed around age 10. Some joint aches at around week 5 or so. Due for colonoscopy. - Colonoscopy; Future  - C-reactive protein; Future  - Comprehensive metabolic panel; Future  - CBC; Future  - Infliximab Concentration and Anti-Infliximab Antibody([facility]); Future  - polyethylene glycol (GaviLyte-G) 4000 mL solution; Take 4,000 mL by mouth once for 1 dose  Dispense: 4000 mL; Refill: 0    2. History of colon polyps  Due for colonoscopy  - Colonoscopy; Future  - C-reactive protein; Future  - Comprehensive metabolic panel; Future  - CBC; Future  - polyethylene glycol (GaviLyte-G) 4000 mL solution; Take 4,000 mL by mouth once for 1 dose  Dispense: 4000 mL; Refill: 0    3. Encounter for long-term (current) use of medications  Infliximab 15 years 10 mg/kg every 6 weeks some breakthrough after for 5 weeks. He will otherwise follow-up in 3 to 4 months  - C-reactive protein; Future  - Comprehensive metabolic panel; Future  - CBC; Future  - Infliximab Concentration and Anti-Infliximab Antibody([facility]); Future  - polyethylene glycol (GaviLyte-G) 4000 mL solution; Take 4,000 mL by mouth once for 1 dose  Dispense: 4000 mL; Refill: 0    ______________________________________________________________________    SUBJECTIVE: Very pleasant gentleman 34years of age long-term infliximab for Crohn's disease large intestine followed by Dr. Alma Sales.  Does fairly well but experiences some joint pains and some mild breakthrough symptoms after week 4 or 5. He gets his infliximab every 6 weeks 10 mg/kg. Tolerates it well. No travel out of the country. No fevers chills night sweats abdominal pain.   Works full-time job in construction. Does smoke. REVIEW OF SYSTEMS IS OTHERWISE NEGATIVE. Historical Information   Past Medical History:   Diagnosis Date   • Colon polyp    • Crohn's disease ([address])    • GERD (gastroesophageal reflux disease)     occasion   • History of Crohn's disease    • Lumbar herniated disc      Past Surgical History:   Procedure Laterality Date   • BACK SURGERY      herniated disc surgery   • COLONOSCOPY     • WISDOM TOOTH EXTRACTION       Social History   Social History     Substance and Sexual Activity   Alcohol Use Yes    Comment: occasional     Social History     Substance and Sexual Activity   Drug Use Not Currently     Social History     Tobacco Use   Smoking Status Every Day   • Packs/day: 1.00   • Types: Cigarettes   Smokeless Tobacco Never     Family History   Problem Relation Age of Onset   • Heart attack Father    • Diabetes Father    • Neuropathy Father    • Kidney disease Father    • Colon cancer Maternal Uncle        Meds/Allergies       Current Outpatient Medications:   •  inFLIXimab (REMICADE) 100 mg  •  polyethylene glycol (GaviLyte-G) 4000 mL solution  •  albuterol (Proventil HFA) 90 mcg/act inhaler    No Known Allergies        Objective     Blood pressure 138/82, pulse 79, height 5' 9" (1.753 m), weight 89.8 kg (198 lb). Body mass index is 29.24 kg/m². PHYSICAL EXAM:      General Appearance:   Alert, cooperative, no distress   HEENT:   Normocephalic, atraumatic, anicteric.     Neck:  Supple, symmetrical, trachea midline   Lungs:   Clear to auscultation bilaterally; no rales, rhonchi or wheezing; respirations unlabored    Heart[de-identified]   Regular rate and rhythm; no murmur, rub, or gallop.    Abdomen:   Soft, non-tender, non-distended; normal bowel sounds; no masses, no organomegaly    Genitalia:   Deferred    Rectal:   Deferred    Extremities:  No cyanosis, clubbing or edema    Pulses:  2+ and symmetric    Skin:  No jaundice, rashes, or lesions    Lymph nodes:  No palpable cervical lymphadenopathy        Lab Results:   No visits with results within 1 Day(s) from this visit. Latest known visit with results is:   Appointment on 11/23/2022   Component Date Value   • WBC 11/23/2022 11.12 (H)    • RBC 11/23/2022 4.82    • Hemoglobin 11/23/2022 15.3    • Hematocrit 11/23/2022 44.9    • MCV 11/23/2022 93    • MCH 11/23/2022 31.7    • MCHC 11/23/2022 34.1    • RDW 11/23/2022 11.8    • MPV 11/23/2022 9.4    • Platelets 62/08/2199 262    • nRBC 11/23/2022 0    • Neutrophils Relative 11/23/2022 62    • Immat GRANS % 11/23/2022 0    • Lymphocytes Relative 11/23/2022 31    • Monocytes Relative 11/23/2022 6    • Eosinophils Relative 11/23/2022 1    • Basophils Relative 11/23/2022 0    • Neutrophils Absolute 11/23/2022 6.83    • Immature Grans Absolute 11/23/2022 0.05    • Lymphocytes Absolute 11/23/2022 3.40    • Monocytes Absolute 11/23/2022 0.71    • Eosinophils Absolute 11/23/2022 0.08    • Basophils Absolute 11/23/2022 0.05    • Sodium 11/23/2022 140    • Potassium 11/23/2022 3.9    • Chloride 11/23/2022 109 (H)    • CO2 11/23/2022 23    • ANION GAP 11/23/2022 8    • BUN 11/23/2022 10    • Creatinine 11/23/2022 0.81    • Glucose, Fasting 11/23/2022 108 (H)    • Calcium 11/23/2022 9.7    • AST 11/23/2022 17    • ALT 11/23/2022 38    • Alkaline Phosphatase 11/23/2022 112    • Total Protein 11/23/2022 8.3    • Albumin 11/23/2022 3.5    • Total Bilirubin 11/23/2022 0.40    • eGFR 11/23/2022 111    • CRP 11/23/2022 6.0 (H)    • Hep B S Ab 11/23/2022 <3.10    • QFT Nil 11/23/2022 0.04    • QFT TB1-NIL 11/23/2022 0.00    • QFT TB2-NIL 11/23/2022 0.00    • QFT Mitogen-NIL 11/23/2022 >10.00    • QFT Final Interpretation 11/23/2022 Negative    • Hepatitis B Surface Ag 11/23/2022 Non-reactive    • Calprotectin 11/23/2022 411 (H)    • Giardia Ag, Stl 11/23/2022 Negative    • C.difficile toxin by PCR 11/23/2022 Negative          Radiology Results:   No results found.

## 2023-08-07 NOTE — TELEPHONE ENCOUNTER
Scheduled date of colonoscopy (as of today):08/22/23  Physician performing colonoscopy:Dr. Jonas  Location of colonoscopy:[facility]  Bowel prep reviewed with patient:Pau  Instructions reviewed with patient by:liang  Clearances: n/a

## 2023-08-11 ENCOUNTER — TELEPHONE (OUTPATIENT)
Age: 38
End: 2023-08-11

## 2023-08-11 NOTE — TELEPHONE ENCOUNTER
Patients GI provider:  Dr. Taye Otoole    Number to return call: [phone]     Reason for call: Yuniel Pizano from [facility]  calling requesting to speak with someone regarding renewal of prior auth for patient's remecade infusion. Ynuiel Pizano requesting for  Clinical office notes, appts and anything that can support the prior auth. fax to [phone]     Scheduled procedure/appointment date if applicable: Apt/procedure

## 2023-08-14 NOTE — TELEPHONE ENCOUNTER
Faxed office note from 8/7/23 to Angel Garner at [facility] [phone] so they can reauth the Remicade infusion.

## 2023-08-15 ENCOUNTER — OFFICE VISIT (OUTPATIENT)
Dept: FAMILY MEDICINE CLINIC | Facility: CLINIC | Age: 38
End: 2023-08-15
Payer: COMMERCIAL

## 2023-08-15 VITALS
SYSTOLIC BLOOD PRESSURE: 130 MMHG | HEART RATE: 74 BPM | HEIGHT: 69 IN | RESPIRATION RATE: 18 BRPM | OXYGEN SATURATION: 96 % | TEMPERATURE: 98.8 F | DIASTOLIC BLOOD PRESSURE: 78 MMHG | BODY MASS INDEX: 29.06 KG/M2 | WEIGHT: 196.2 LBS

## 2023-08-15 DIAGNOSIS — I10 ESSENTIAL HYPERTENSION: ICD-10-CM

## 2023-08-15 DIAGNOSIS — Z00.00 ANNUAL PHYSICAL EXAM: ICD-10-CM

## 2023-08-15 DIAGNOSIS — K50.119 CROHN'S DISEASE OF LARGE INTESTINE WITH COMPLICATION (HCC): ICD-10-CM

## 2023-08-15 DIAGNOSIS — M25.50 POLYARTHRALGIA: Primary | ICD-10-CM

## 2023-08-15 PROCEDURE — 99386 PREV VISIT NEW AGE 40-64: CPT | Performed by: FAMILY MEDICINE

## 2023-08-15 NOTE — PROGRESS NOTES
BMI Counseling: Body mass index is 28.97 kg/m². The BMI is above normal. Nutrition recommendations include reducing portion sizes, 3-5 servings of fruits/vegetables daily, reducing fast food intake, decreasing soda and/or juice intake, moderation in carbohydrate intake, increasing intake of lean protein and reducing intake of saturated fat and trans fat. Exercise recommendations include exercising 3-5 times per week. ADULT ANNUAL PHYSICAL  [facility]    NAME: Malorie Carpenter  AGE: 34 y.o. SEX: male  : 1985     DATE: 8/15/2023     Assessment and Plan:     Problem List Items Addressed This Visit        Cardiovascular and Mediastinum    Essential hypertension     Central hypertension under stable control off medication patient will avoid sodium work on daily exercise and maintain same weight control            Other    Crohn's disease ([address])     Crohn's disease stable with Remicade continue to follow-up closely with gastroenterology         Polyarthralgia - Primary     Check Lyme titer now this also could be related to Remicade or Crohn's itself. Patient will be followed up by his gastroenterologist.  Crohn's has been under stable control recently with the Remicade. Additionally check all lab work including C-reactive protein         Relevant Orders    C-reactive protein    Lyme Disease Serology w/Reflex    Annual physical exam    Relevant Orders    C-reactive protein    Lyme Disease Serology w/Reflex    CBC and differential    Comprehensive metabolic panel    Lipid panel    TSH, 3rd generation with Free T4 reflex    PSA, total and free       Immunizations and preventive care screenings were discussed with patient today. Appropriate education was printed on patient's after visit summary. Discussed risks and benefits of prostate cancer screening.  We discussed the controversial history of PSA screening for prostate cancer in the [facility] as well as the risk of over detection and over treatment of prostate cancer by way of PSA screening. The patient understands that PSA blood testing is an imperfect way to screen for prostate cancer and that elevated PSA levels in the blood may also be caused by infection, inflammation, prostatic trauma or manipulation, urological procedures, or by benign prostatic enlargement. The role of the digital rectal examination in prostate cancer screening was also discussed and I discussed with him that there is large interobserver variability in the findings of digital rectal examination. Counseling:  Alcohol/drug use: discussed moderation in alcohol intake, the recommendations for healthy alcohol use, and avoidance of illicit drug use. Dental Health: discussed importance of regular tooth brushing, flossing, and dental visits. Injury prevention: discussed safety/seat belts, safety helmets, smoke detectors, carbon dioxide detectors, and smoking near bedding or upholstery. Sexual health: discussed sexually transmitted diseases, partner selection, use of condoms, avoidance of unintended pregnancy, and contraceptive alternatives. · Exercise: the importance of regular exercise/physical activity was discussed. Recommend exercise 3-5 times per week for at least 30 minutes. Return in about 1 year (around 8/15/2024) for [address] [place] [address], Annual physical.     Chief Complaint:     Chief Complaint   Patient presents with   • Establish Care   • Physical Exam      History of Present Illness:     Adult Annual Physical   Patient here for a comprehensive physical exam. The patient reports no problems. Diet and Physical Activity  · Diet/Nutrition: well balanced diet. · Exercise: no formal exercise.       Depression Screening  PHQ-2/9 Depression Screening    Little interest or pleasure in doing things: 0 - not at all  Feeling down, depressed, or hopeless: 0 - not at all  PHQ-2 Score: 0  PHQ-2 Interpretation: Negative depression screen       General Health  · Sleep: sleeps well. · Hearing: normal - bilateral.  · Vision: no vision problems. · Dental: regular dental visits.  Health  · Symptoms include: none     Review of Systems:     Review of Systems   Constitutional: Negative for chills, fatigue and fever. HENT: Negative for congestion, nosebleeds, rhinorrhea, sinus pressure and sore throat. Eyes: Negative for discharge and redness. Respiratory: Negative for cough and shortness of breath. Cardiovascular: Negative for chest pain, palpitations and leg swelling. Gastrointestinal: Negative for abdominal pain, blood in stool and nausea. Endocrine: Negative for cold intolerance, heat intolerance and polyuria. Genitourinary: Negative for dysuria and frequency. Musculoskeletal: Negative for arthralgias, back pain and myalgias. Skin: Negative for rash. Neurological: Negative for dizziness, weakness and headaches. Hematological: Negative for adenopathy. Psychiatric/Behavioral: Negative for behavioral problems and sleep disturbance. The patient is not nervous/anxious.        Past Medical History:     Past Medical History:   Diagnosis Date   • Colon polyp    • Crohn's disease ([address])    • GERD (gastroesophageal reflux disease)     occasion   • History of Crohn's disease    • Lumbar herniated disc       Past Surgical History:     Past Surgical History:   Procedure Laterality Date   • BACK SURGERY      herniated disc surgery   • COLONOSCOPY     • WISDOM TOOTH EXTRACTION        Family History:     Family History   Problem Relation Age of Onset   • Heart attack Father    • Diabetes Father    • Neuropathy Father    • Kidney disease Father    • Colon cancer Maternal Uncle       Social History:     Social History     Socioeconomic History   • Marital status: Single     Spouse name: None   • Number of children: None   • Years of education: None   • Highest education level: None   Occupational History   • None   Tobacco Use • Smoking status: Every Day     Packs/day: 1.00     Types: Cigarettes   • Smokeless tobacco: Never   Vaping Use   • Vaping Use: None   Substance and Sexual Activity   • Alcohol use: Yes     Comment: occasional   • Drug use: Not Currently   • Sexual activity: None   Other Topics Concern   • None   Social History Narrative   • None     Social Determinants of Health     Financial Resource Strain: Not on file   Food Insecurity: Not on file   Transportation Needs: Not on file   Physical Activity: Not on file   Stress: Not on file   Social Connections: Not on file   Intimate Partner Violence: Not on file   Housing Stability: Not on file      Current Medications:     Current Outpatient Medications   Medication Sig Dispense Refill   • inFLIXimab (REMICADE) 100 mg Infuse into a venous catheter     • polyethylene glycol (GaviLyte-G) 4000 mL solution Take 4,000 mL by mouth once for 1 dose 4000 mL 0   • albuterol (Proventil HFA) 90 mcg/act inhaler Inhale 2 puffs every 6 (six) hours as needed for wheezing or shortness of breath (Patient not taking: Reported on 8/15/2023) 8 g 0     No current facility-administered medications for this visit. Allergies:     No Known Allergies   Physical Exam:     /78 (BP Location: Right arm, Patient Position: Sitting, Cuff Size: Large)   Pulse 74   Temp 98.8 °F (37.1 °C) (Tympanic)   Resp 18   Ht 5' 9" (1.753 m)   Wt 89 kg (196 lb 3.2 oz)   SpO2 96%   BMI 28.97 kg/m²     Physical Exam  Vitals and nursing note reviewed. Constitutional:       General: He is not in acute distress. Appearance: Normal appearance. He is well-developed and normal weight. HENT:      Head: Normocephalic and atraumatic. Right Ear: Tympanic membrane, ear canal and external ear normal.      Left Ear: Tympanic membrane, ear canal and external ear normal.      Nose: Nose normal.      Mouth/Throat:      Mouth: Mucous membranes are moist.      Pharynx: Oropharynx is clear.    Eyes:      Extraocular Movements: Extraocular movements intact. Conjunctiva/sclera: Conjunctivae normal.   Cardiovascular:      Rate and Rhythm: Normal rate and regular rhythm. Pulses: Normal pulses. Heart sounds: Normal heart sounds. No murmur heard. Pulmonary:      Effort: Pulmonary effort is normal. No respiratory distress. Breath sounds: Normal breath sounds. Abdominal:      General: Bowel sounds are normal.      Palpations: Abdomen is soft. Tenderness: There is no abdominal tenderness. Musculoskeletal:         General: No swelling. Normal range of motion. Cervical back: Normal range of motion and neck supple. Skin:     General: Skin is warm and dry. Capillary Refill: Capillary refill takes less than 2 seconds. Neurological:      General: No focal deficit present. Mental Status: He is alert and oriented to person, place, and time. Mental status is at baseline. Psychiatric:         Mood and Affect: Mood normal.         Behavior: Behavior normal.         Thought Content:  Thought content normal.         Judgment: Judgment normal.          [facility] O'Michael, DO  [address] [facility]

## 2023-08-15 NOTE — ASSESSMENT & PLAN NOTE
Central hypertension under stable control off medication patient will avoid sodium work on daily exercise and maintain same weight control

## 2023-08-16 ENCOUNTER — TELEPHONE (OUTPATIENT)
Dept: GASTROENTEROLOGY | Facility: CLINIC | Age: 38
End: 2023-08-16

## 2023-08-16 NOTE — TELEPHONE ENCOUNTER
lmom confirming pt's colonoscopy scheduled on 8/22/23 at [facility] ER with Dr Bonifacio Bain.  Informed [facility] would be calling 1-2 days prior with the arrival time. Informed of clear liquid diet day prior as well as the bowel cleansing preparation. Informed would need a  the day of the procedure due to being under sedation. I asked pt to please call back if has not received instructions or if has any questions.

## 2023-08-19 ENCOUNTER — NURSE TRIAGE (OUTPATIENT)
Dept: OTHER | Facility: OTHER | Age: 38
End: 2023-08-19

## 2023-08-19 DIAGNOSIS — Z86.010 HISTORY OF COLON POLYPS: ICD-10-CM

## 2023-08-19 DIAGNOSIS — K50.10 CROHN'S DISEASE OF LARGE INTESTINE WITHOUT COMPLICATION (HCC): ICD-10-CM

## 2023-08-19 DIAGNOSIS — Z79.899 ENCOUNTER FOR LONG-TERM (CURRENT) USE OF MEDICATIONS: ICD-10-CM

## 2023-08-19 RX ORDER — POLYETHYLENE GLYCOL-3350 AND ELECTROLYTES 236; 6.74; 5.86; 2.97; 22.74 G/274.31G; G/274.31G; G/274.31G; G/274.31G; G/274.31G
4 POWDER, FOR SOLUTION ORAL ONCE
Qty: 4000 ML | Refills: 0 | Status: SHIPPED | OUTPATIENT
Start: 2023-08-19 | End: 2023-08-21 | Stop reason: SDUPTHER

## 2023-08-19 NOTE — TELEPHONE ENCOUNTER
Reason for Disposition  • [1] Prescription prescribed recently is not at pharmacy AND [2] triager has access to patient's EMR AND [3] prescription is recorded in the EMR    Answer Assessment - Initial Assessment Questions  1. NAME of MEDICATION: "What medicine are you calling about?"      Golytely   2. QUESTION: "What is your question?" (e.g., medication refill, side effect)     I don't have it. If it was ordered for a mail order I didn't get it. Can I pick that up locally? 3. PRESCRIBING HCP: "Who prescribed it?" Reason: if prescribed by specialist, call should be referred to that group. GI  4. SYMPTOMS: "Do you have any symptoms?"      Having colonoscopy on Tuesday and needs to do prep  5.  SEVERITY: If symptoms are present, ask "Are they mild, moderate or severe?"     n/a    Protocols used: MEDICATION QUESTION CALL-ADULT-

## 2023-08-19 NOTE — TELEPHONE ENCOUNTER
Pt states he thinks his Golytely prep was ordered through a mail order pharmacy and he did not receive it. Requesting reorder through [employer] in [place]. Reorder completed.

## 2023-08-19 NOTE — TELEPHONE ENCOUNTER
Regarding: Colonoscopy Bowel Prep not sent to Pharmacy  ----- Message from Mckay Shearer sent at 8/19/2023 10:34 AM EDT -----  " My  is having a Colonoscopy on Tuesday and he has not gotten his Bowel Prep sent to the Pharmacy . "

## 2023-08-19 NOTE — TELEPHONE ENCOUNTER
Regarding: Colonoscopy Bowel Prep not sent to Pharmacy  ----- Message from Jame Soulier sent at 8/19/2023 12:36 PM EDT -----  Patients wife called back stating no one called them regarding the colonoscopy. I mentioned that maybe they both have call block but she states they do not. Please try both numbers in chart    My  is having a Colonoscopy on Tuesday and he has not gotten his Bowel Prep sent to the Pharmacy . "       Colonoscopy Bowel Prep not sent to Pharmacy

## 2023-08-21 ENCOUNTER — LAB (OUTPATIENT)
Dept: LAB | Facility: MEDICAL CENTER | Age: 38
End: 2023-08-21
Payer: COMMERCIAL

## 2023-08-21 DIAGNOSIS — K50.10 CROHN'S DISEASE OF LARGE INTESTINE WITHOUT COMPLICATION (HCC): ICD-10-CM

## 2023-08-21 DIAGNOSIS — M25.50 POLYARTHRALGIA: ICD-10-CM

## 2023-08-21 DIAGNOSIS — Z86.010 HISTORY OF COLON POLYPS: ICD-10-CM

## 2023-08-21 DIAGNOSIS — Z79.899 ENCOUNTER FOR LONG-TERM (CURRENT) USE OF MEDICATIONS: ICD-10-CM

## 2023-08-21 DIAGNOSIS — Z00.00 ANNUAL PHYSICAL EXAM: ICD-10-CM

## 2023-08-21 LAB
ALBUMIN SERPL BCP-MCNC: 3.6 G/DL (ref 3.5–5)
ALP SERPL-CCNC: 105 U/L (ref 46–116)
ALT SERPL W P-5'-P-CCNC: 25 U/L (ref 12–78)
ANION GAP SERPL CALCULATED.3IONS-SCNC: 6 MMOL/L
AST SERPL W P-5'-P-CCNC: 15 U/L (ref 5–45)
B BURGDOR IGG+IGM SER QL IA: NEGATIVE
BASOPHILS # BLD AUTO: 0.03 THOUSANDS/ÂΜL (ref 0–0.1)
BASOPHILS NFR BLD AUTO: 0 % (ref 0–1)
BILIRUB SERPL-MCNC: 0.5 MG/DL (ref 0.2–1)
BUN SERPL-MCNC: 8 MG/DL (ref 5–25)
CALCIUM SERPL-MCNC: 9.3 MG/DL (ref 8.3–10.1)
CHLORIDE SERPL-SCNC: 110 MMOL/L (ref 96–108)
CHOLEST SERPL-MCNC: 144 MG/DL
CO2 SERPL-SCNC: 24 MMOL/L (ref 21–32)
CREAT SERPL-MCNC: 0.89 MG/DL (ref 0.6–1.3)
CRP SERPL QL: 14.1 MG/L
EOSINOPHIL # BLD AUTO: 0.13 THOUSAND/ÂΜL (ref 0–0.61)
EOSINOPHIL NFR BLD AUTO: 2 % (ref 0–6)
ERYTHROCYTE [DISTWIDTH] IN BLOOD BY AUTOMATED COUNT: 12.6 % (ref 11.6–15.1)
GFR SERPL CREATININE-BSD FRML MDRD: 106 ML/MIN/1.73SQ M
GLUCOSE P FAST SERPL-MCNC: 115 MG/DL (ref 65–99)
HCT VFR BLD AUTO: 47.3 % (ref 36.5–49.3)
HDLC SERPL-MCNC: 45 MG/DL
HGB BLD-MCNC: 15.2 G/DL (ref 12–17)
IMM GRANULOCYTES # BLD AUTO: 0.03 THOUSAND/UL (ref 0–0.2)
IMM GRANULOCYTES NFR BLD AUTO: 0 % (ref 0–2)
LDLC SERPL CALC-MCNC: 79 MG/DL (ref 0–100)
LYMPHOCYTES # BLD AUTO: 2.21 THOUSANDS/ÂΜL (ref 0.6–4.47)
LYMPHOCYTES NFR BLD AUTO: 28 % (ref 14–44)
MCH RBC QN AUTO: 31 PG (ref 26.8–34.3)
MCHC RBC AUTO-ENTMCNC: 32.1 G/DL (ref 31.4–37.4)
MCV RBC AUTO: 97 FL (ref 82–98)
MONOCYTES # BLD AUTO: 0.91 THOUSAND/ÂΜL (ref 0.17–1.22)
MONOCYTES NFR BLD AUTO: 11 % (ref 4–12)
NEUTROPHILS # BLD AUTO: 4.71 THOUSANDS/ÂΜL (ref 1.85–7.62)
NEUTS SEG NFR BLD AUTO: 59 % (ref 43–75)
NONHDLC SERPL-MCNC: 99 MG/DL
NRBC BLD AUTO-RTO: 0 /100 WBCS
PLATELET # BLD AUTO: 222 THOUSANDS/UL (ref 149–390)
PMV BLD AUTO: 9.4 FL (ref 8.9–12.7)
POTASSIUM SERPL-SCNC: 3.8 MMOL/L (ref 3.5–5.3)
PROT SERPL-MCNC: 7.7 G/DL (ref 6.4–8.4)
RBC # BLD AUTO: 4.9 MILLION/UL (ref 3.88–5.62)
SODIUM SERPL-SCNC: 140 MMOL/L (ref 135–147)
TRIGL SERPL-MCNC: 101 MG/DL
TSH SERPL DL<=0.05 MIU/L-ACNC: 0.97 UIU/ML (ref 0.45–4.5)
WBC # BLD AUTO: 8.02 THOUSAND/UL (ref 4.31–10.16)

## 2023-08-21 PROCEDURE — 82397 CHEMILUMINESCENT ASSAY: CPT

## 2023-08-21 PROCEDURE — 84153 ASSAY OF PSA TOTAL: CPT

## 2023-08-21 PROCEDURE — 80061 LIPID PANEL: CPT

## 2023-08-21 PROCEDURE — 84154 ASSAY OF PSA FREE: CPT

## 2023-08-21 PROCEDURE — 86618 LYME DISEASE ANTIBODY: CPT

## 2023-08-21 PROCEDURE — 84443 ASSAY THYROID STIM HORMONE: CPT

## 2023-08-21 PROCEDURE — 80053 COMPREHEN METABOLIC PANEL: CPT

## 2023-08-21 PROCEDURE — 86140 C-REACTIVE PROTEIN: CPT

## 2023-08-21 PROCEDURE — 36415 COLL VENOUS BLD VENIPUNCTURE: CPT

## 2023-08-21 PROCEDURE — 85025 COMPLETE CBC W/AUTO DIFF WBC: CPT

## 2023-08-21 PROCEDURE — 80230 DRUG ASSAY INFLIXIMAB: CPT

## 2023-08-21 RX ORDER — POLYETHYLENE GLYCOL-3350 AND ELECTROLYTES 236; 6.74; 5.86; 2.97; 22.74 G/274.31G; G/274.31G; G/274.31G; G/274.31G; G/274.31G
4 POWDER, FOR SOLUTION ORAL ONCE
Qty: 4000 ML | Refills: 0 | Status: SHIPPED | OUTPATIENT
Start: 2023-08-21 | End: 2023-08-22 | Stop reason: ALTCHOICE

## 2023-08-21 RX ORDER — POLYETHYLENE GLYCOL-3350 AND ELECTROLYTES 236; 6.74; 5.86; 2.97; 22.74 G/274.31G; G/274.31G; G/274.31G; G/274.31G; G/274.31G
4 POWDER, FOR SOLUTION ORAL ONCE
Qty: 4000 ML | Refills: 0 | Status: SHIPPED | OUTPATIENT
Start: 2023-08-21 | End: 2023-08-21 | Stop reason: SDUPTHER

## 2023-08-21 NOTE — TELEPHONE ENCOUNTER
Can you confirm which pharmacy?  I resent it to [facility] [address] in [facility], make sure that is the pharmacy they want because it was just sent there 2 days ago

## 2023-08-21 NOTE — TELEPHONE ENCOUNTER
I called and spoke to patient. Please resend to [facility] pharmacy.  Thank you

## 2023-08-22 ENCOUNTER — ANESTHESIA (OUTPATIENT)
Dept: GASTROENTEROLOGY | Facility: AMBULATORY SURGERY CENTER | Age: 38
End: 2023-08-22

## 2023-08-22 ENCOUNTER — HOSPITAL ENCOUNTER (OUTPATIENT)
Dept: GASTROENTEROLOGY | Facility: AMBULATORY SURGERY CENTER | Age: 38
Discharge: HOME | End: 2023-08-22
Payer: COMMERCIAL

## 2023-08-22 ENCOUNTER — ANESTHESIA EVENT (OUTPATIENT)
Dept: GASTROENTEROLOGY | Facility: AMBULATORY SURGERY CENTER | Age: 38
End: 2023-08-22

## 2023-08-22 VITALS
BODY MASS INDEX: 28.88 KG/M2 | HEIGHT: 69 IN | TEMPERATURE: 99.1 F | WEIGHT: 195 LBS | SYSTOLIC BLOOD PRESSURE: 118 MMHG | HEART RATE: 60 BPM | OXYGEN SATURATION: 98 % | DIASTOLIC BLOOD PRESSURE: 72 MMHG | RESPIRATION RATE: 18 BRPM

## 2023-08-22 DIAGNOSIS — Z86.010 HISTORY OF COLON POLYPS: ICD-10-CM

## 2023-08-22 DIAGNOSIS — K50.10 CROHN'S DISEASE OF LARGE INTESTINE WITHOUT COMPLICATION (HCC): ICD-10-CM

## 2023-08-22 LAB
PSA FREE MFR SERPL: 25.6 %
PSA FREE SERPL-MCNC: 0.23 NG/ML
PSA SERPL-MCNC: 0.9 NG/ML (ref 0–4)

## 2023-08-22 PROCEDURE — 88305 TISSUE EXAM BY PATHOLOGIST: CPT | Performed by: PATHOLOGY

## 2023-08-22 PROCEDURE — 45380 COLONOSCOPY AND BIOPSY: CPT | Performed by: INTERNAL MEDICINE

## 2023-08-22 PROCEDURE — 88342 IMHCHEM/IMCYTCHM 1ST ANTB: CPT | Performed by: PATHOLOGY

## 2023-08-22 RX ORDER — PROPOFOL 10 MG/ML
INJECTION, EMULSION INTRAVENOUS AS NEEDED
Status: DISCONTINUED | OUTPATIENT
Start: 2023-08-22 | End: 2023-08-22

## 2023-08-22 RX ORDER — SODIUM CHLORIDE, SODIUM LACTATE, POTASSIUM CHLORIDE, CALCIUM CHLORIDE 600; 310; 30; 20 MG/100ML; MG/100ML; MG/100ML; MG/100ML
INJECTION, SOLUTION INTRAVENOUS CONTINUOUS PRN
Status: DISCONTINUED | OUTPATIENT
Start: 2023-08-22 | End: 2023-08-22

## 2023-08-22 RX ORDER — SODIUM CHLORIDE, SODIUM LACTATE, POTASSIUM CHLORIDE, CALCIUM CHLORIDE 600; 310; 30; 20 MG/100ML; MG/100ML; MG/100ML; MG/100ML
20 INJECTION, SOLUTION INTRAVENOUS CONTINUOUS
Status: DISCONTINUED | OUTPATIENT
Start: 2023-08-22 | End: 2023-08-26 | Stop reason: HOSPADM

## 2023-08-22 RX ADMIN — PROPOFOL 100 MCG/KG/MIN: 10 INJECTION, EMULSION INTRAVENOUS at 10:16

## 2023-08-22 RX ADMIN — SODIUM CHLORIDE, SODIUM LACTATE, POTASSIUM CHLORIDE, CALCIUM CHLORIDE: 600; 310; 30; 20 INJECTION, SOLUTION INTRAVENOUS at 10:03

## 2023-08-22 RX ADMIN — PROPOFOL 100 MG: 10 INJECTION, EMULSION INTRAVENOUS at 10:16

## 2023-08-22 NOTE — ANESTHESIA PREPROCEDURE EVALUATION
Procedure:  COLONOSCOPY    Relevant Problems   CARDIO   (+) Essential hypertension      Crohn's Disease   Every day smoker     Physical Exam    Airway    Mallampati score: II  TM Distance: >3 FB  Neck ROM: full     Dental       Cardiovascular      Pulmonary      Other Findings        Anesthesia Plan  ASA Score- 2     Anesthesia Type- IV sedation with anesthesia with ASA Monitors. Additional Monitors:   Airway Plan:           Plan Factors-Exercise tolerance (METS): >4 METS. Chart reviewed. Patient summary reviewed. Patient is a current smoker. Patient smoked on day of surgery. Obstructive sleep apnea risk education given perioperatively. Induction- intravenous. Postoperative Plan-     Informed Consent- Anesthetic plan and risks discussed with patient. I personally reviewed this patient with the CRNA. Discussed and agreed on the Anesthesia Plan with the CRNA. Donn Salinas

## 2023-08-22 NOTE — ANESTHESIA POSTPROCEDURE EVALUATION
Post-Op Assessment Note    CV Status:  Stable  Pain Score: 0    Pain management: adequate     Mental Status:  Alert and awake   Hydration Status:  Euvolemic   PONV Controlled:  Controlled   Airway Patency:  Patent      Post Op Vitals Reviewed: Yes      Staff: CRNA, Anesthesiologist         No notable events documented.     BP   114/60   Temp   98.2   Pulse  70   Resp   16   SpO2   99

## 2023-08-22 NOTE — H&P
History and Physical - [facility]  [facility] Sign[facility] Laci 34 y.o. male MRN: [id number]                  HPI: Wayne Bell is a 34y.o. year old male who presents for history of Crohn's      REVIEW OF SYSTEMS: Per the HPI, and otherwise unremarkable. Historical Information   Past Medical History:   Diagnosis Date   • Colon polyp    • Crohn's disease ([address])    • GERD (gastroesophageal reflux disease)     occasion   • History of Crohn's disease    • Lumbar herniated disc      Past Surgical History:   Procedure Laterality Date   • BACK SURGERY      herniated disc surgery   • COLONOSCOPY     • EGD     • WISDOM TOOTH EXTRACTION       Social History   Social History     Substance and Sexual Activity   Alcohol Use Yes    Comment: occasional     Social History     Substance and Sexual Activity   Drug Use Not Currently     Social History     Tobacco Use   Smoking Status Every Day   • Packs/day: 1.00   • Types: Cigarettes   Smokeless Tobacco Never     Family History   Problem Relation Age of Onset   • Heart attack Father    • Diabetes Father    • Neuropathy Father    • Kidney disease Father    • Cancer Maternal Uncle    • Colon cancer Maternal Uncle        Meds/Allergies       Current Outpatient Medications:   •  inFLIXimab (REMICADE) 100 mg  •  albuterol (Proventil HFA) 90 mcg/act inhaler    No Known Allergies    Objective     /85   Pulse 58 Comment: NSR  Temp 99.1 °F (37.3 °C) (Temporal)   Resp 16   Ht 5' 9" (1.753 m)   Wt 88.5 kg (195 lb)   SpO2 95%   BMI 28.80 kg/m²       PHYSICAL EXAM    Gen: NAD  Head: NCAT  CV: RRR  CHEST: Clear  ABD: soft, NT/ND  EXT: no edema      ASSESSMENT/PLAN:  This is a 34y.o. year old male here for colonoscopy, and he is stable and optimized for his procedure.

## 2023-08-23 NOTE — RESULT ENCOUNTER NOTE
The patient's CBC and CMP results were unremarkable/negative. His C-reactive protein however was elevated to 14 which indicates inflammation. We are waiting for the patient's infliximab levels and antibodies. This was communicated on my chart.

## 2023-08-28 LAB
INFLIXIMAB AB SERPL-MCNC: <22 NG/ML
INFLIXIMAB SERPL-MCNC: 10 UG/ML

## 2023-08-28 PROCEDURE — 88342 IMHCHEM/IMCYTCHM 1ST ANTB: CPT | Performed by: PATHOLOGY

## 2023-08-28 PROCEDURE — 88305 TISSUE EXAM BY PATHOLOGIST: CPT | Performed by: PATHOLOGY

## 2023-08-29 NOTE — RESULT ENCOUNTER NOTE
The infliximab levels and antibody results were unremarkable/negative. This was communicated on my chart.

## 2023-09-05 ENCOUNTER — OFFICE VISIT (OUTPATIENT)
Dept: GASTROENTEROLOGY | Facility: CLINIC | Age: 38
End: 2023-09-05
Payer: COMMERCIAL

## 2023-09-05 VITALS
HEIGHT: 69 IN | SYSTOLIC BLOOD PRESSURE: 130 MMHG | HEART RATE: 67 BPM | BODY MASS INDEX: 28.88 KG/M2 | WEIGHT: 195 LBS | DIASTOLIC BLOOD PRESSURE: 81 MMHG

## 2023-09-05 DIAGNOSIS — K50.10 CROHN'S DISEASE OF LARGE INTESTINE WITHOUT COMPLICATION (HCC): Primary | ICD-10-CM

## 2023-09-05 PROCEDURE — 99214 OFFICE O/P EST MOD 30 MIN: CPT | Performed by: INTERNAL MEDICINE

## 2023-09-05 NOTE — PROGRESS NOTES
[facility] - Outpatient Follow-up Note  José Antonio Aguero 34 y.o. male MRN: [id number]  Encounter: [id number]          ASSESSMENT AND PLAN:      1. Crohn's disease of large intestine without complication ([address])      History of chronic disease, Crohn's colitis, patchy inflammation of the colon, biopsies consistent with chronic colitis. Suboptimal response to Remicade which he has been on 15 years. His antibody level and drug levels are satisfactory. I think he needs to change his treatment. Discussed at length with patient because of active disease on colonoscopy and suboptimal management of his symptoms, he is agreeable to try a different agent. We will also have him seen by our IBD specialist to get their opinion and then go from there. He has declined vaccinations in the past.  ______________________________________________________________________    SUBJECTIVE:      Patient with longstanding history of Crohn's disease, diagnosed when he was a teenager around age 12-24, has been on Remicade infusions for more than 15 years was doing well. For the last year or so his symptoms tend to recur after fourth week of infusion, last 1 or 2 weeks before the next infusion he has some cramping urgency increased frequency of stool but no apparent blood. Denies any nocturnal symptoms, has some fatigue appetite is fair weight stable, works in construction physical job. Denies any chest pain shortness of breath fever chills rash, diet medications more than 10 pertinent systems reviewed. REVIEW OF SYSTEMS IS OTHERWISE NEGATIVE.       Historical Information   Past Medical History:   Diagnosis Date   • Colon polyp    • Crohn's disease ([address])    • GERD (gastroesophageal reflux disease)     occasion   • History of Crohn's disease    • Lumbar herniated disc      Past Surgical History:   Procedure Laterality Date   • BACK SURGERY      herniated disc surgery   • COLONOSCOPY     • EGD     • WISDOM TOOTH EXTRACTION       Social History   Social History     Substance and Sexual Activity   Alcohol Use Yes    Comment: occasional     Social History     Substance and Sexual Activity   Drug Use Not Currently     Social History     Tobacco Use   Smoking Status Every Day   • Packs/day: 1.00   • Types: Cigarettes   Smokeless Tobacco Never     Family History   Problem Relation Age of Onset   • Heart attack Father    • Diabetes Father    • Neuropathy Father    • Kidney disease Father    • Cancer Maternal Uncle    • Colon cancer Maternal Uncle        Meds/Allergies       Current Outpatient Medications:   •  inFLIXimab (REMICADE) 100 mg  •  albuterol (Proventil HFA) 90 mcg/act inhaler    No Known Allergies        Objective     Blood pressure 130/81, pulse 67, height 5' 9" (1.753 m), weight 88.5 kg (195 lb). Body mass index is 28.8 kg/m². PHYSICAL EXAM:      General Appearance:   Alert, cooperative, no distress   HEENT:   Normocephalic, atraumatic, anicteric. Neck:  Supple, symmetrical, trachea midline   Lungs:   Clear to auscultation bilaterally; no rales, rhonchi or wheezing; respirations unlabored    Heart[de-identified]   Regular rate and rhythm; no murmur. Abdomen:   Soft, non-tender, non-distended; normal bowel sounds; no masses, no organomegaly    Genitalia:   Deferred    Rectal:   Deferred    Extremities:  No cyanosis, clubbing or edema    Skin:  No jaundice, rashes, or lesions    Lymph nodes:  No palpable cervical lymphadenopathy        Lab Results:   No visits with results within 1 Day(s) from this visit.    Latest known visit with results is:   Hospital Outpatient Visit on 08/22/2023   Component Date Value   • Case Report 08/22/2023                      Value:Surgical Pathology Report                         Case: [id number]                                   Authorizing Provider:  Ira Juárez MD           Collected:           08/22/2023 1027              Ordering Location:     [address] [place] [address] Received: 08/22/2023 [address] [place] [address] Avenue:           Nan Joseph MD                                                         Specimens:   A) - Large Intestine, Transverse Colon, TRANSVERSE cold bx. ..erythema, ulceratons, hx               of Crohn's                                                                                          B) - Rectum, RECTUM cold bx. Clabe Elton, hx of Crohn'S                                    • Addendum 08/22/2023                      Value: This result contains rich text formatting which cannot be displayed here. • Final Diagnosis 08/22/2023                      Value: This result contains rich text formatting which cannot be displayed here. • Additional Information 08/22/2023                      Value: This result contains rich text formatting which cannot be displayed here. • Gross Description 08/22/2023                      Value: This result contains rich text formatting which cannot be displayed here. Radiology Results:   Colonoscopy    Result Date: 8/22/2023  Narrative: Table formatting from the original result was not included. [place] [id number] [phone] DATE OF SERVICE: 8/22/23 PHYSICIAN(S): Attending: Felicitas Tyson MD Fellow: No Staff Documented INDICATION: Crohn's disease of large intestine without complication ([address]), History of colon polyps POST-OP DIAGNOSIS: See the impression below. HISTORY: Prior colonoscopy: 3 years ago. BOWEL PREPARATION: Golytely/Colyte/Trilyte; Dulcolax PREPROCEDURE: Informed consent was obtained for the procedure, including sedation. Risks including but not limited to bleeding, infection, perforation, adverse drug reaction and aspiration were explained in detail. Also explained about less than 100% sensitivity with the exam and other alternatives.  The patient was placed in the left lateral decubitus position. Procedure: Colonoscopy DETAILS OF PROCEDURE: Patient was taken to the procedure room where a time out was performed to confirm correct patient and correct procedure. The patient underwent monitored anesthesia care, which was administered by an anesthesia professional. The patient's blood pressure, heart rate, level of consciousness, oxygen, respirations and ECG were monitored throughout the procedure. A digital rectal exam was performed. The scope was introduced through the anus and advanced to the cecum. Retroflexion was performed in the rectum. The quality of bowel preparation was evaluated using the [facility] Bowel Preparation Scale with scores of: right colon = 2, transverse colon = 2, left colon = 2. The total BBPS score was 6. Bowel prep was adequate. The patient experienced no blood loss. The procedure was not difficult. The patient tolerated the procedure well. There were no apparent adverse events. ANESTHESIA INFORMATION: ASA: II Anesthesia Type: Anesthesia type not filed in the log. MEDICATIONS: No administrations occurring from 1005 to 1033 on 08/22/23 FINDINGS: Severe, patchy edematous, granular and ulcerated mucosa with erosion and loss of folds in the cecum, transverse colon and rectum, consistent with Crohn's disease; performed cold forceps biopsy EVENTS: Procedure Events Event Event Time ENDO CECUM REACHED 8/22/2023 10:21 AM ENDO SCOPE OUT TIME 8/22/2023 10:31 AM SPECIMENS: ID Type Source Tests Collected by Time Destination 1 : TRANSVERSE cold bx. ..erythema, ulceratons, hx of Crohn's Tissue Large Intestine, Transverse Colon TISSUE EXAM Gallo Jonas MD 8/22/2023 10:27 AM  2 : RECTUM cold bx. Charles Duran, hx of Crohn'S Tissue Rectum TISSUE EXAM Shakir Norman MD 8/22/2023 10:32 AM  EQUIPMENT: Colonoscope -[id number]     Impression: Severe abnormal mucosa, consistent with Crohn's disease in the cecum, transverse colon and rectum; performed cold forceps biopsy RECOMMENDATION:  Repeat colonoscopy in 3 years  Inflammatory bowel disease   Ulcerations edema erythema seen in the ileocecal valve area, transverse colon, rectum, highly suggestive of Crohn's disease. Follow-up in my office in 2 to 4 weeks, to discuss other treatment options, current treatment does not seem to be causing mucosal healing.     Rojelio Julio MD

## 2023-09-15 ENCOUNTER — NURSE TRIAGE (OUTPATIENT)
Age: 38
End: 2023-09-15

## 2023-09-15 NOTE — TELEPHONE ENCOUNTER
SPOKE WITH TINO ORELLANA SIGNIFICANT OTHER, F/U FROM LAST OFFICE VISIT, WANTS YOU TO KNOW THAT PT DOES HAVE BLOODY BM'S WHEN HE IS STRESSED. DISCUSSION AT OFFICE VISIT WERE YOU GOING TO REACH OUT TO  IBD SPECIALIST FOR THERAPY CHANGE? PT CURRENTLY ON REMICADE. PLEASE ADVISE. IBD POOL, PLEASE REACH OUT TO ESTEBAN FOR INSURANCE CHANGE INFORMATION, SHE WAS UNABLE TO LOCATE IT ON HER EMAIL WHILE I WAS ON THE PHONE.  [facility]

## 2023-09-26 ENCOUNTER — TELEPHONE (OUTPATIENT)
Dept: GASTROENTEROLOGY | Facility: CLINIC | Age: 38
End: 2023-09-26

## 2023-09-26 ENCOUNTER — TELEMEDICINE (OUTPATIENT)
Dept: GASTROENTEROLOGY | Facility: CLINIC | Age: 38
End: 2023-09-26
Payer: COMMERCIAL

## 2023-09-26 DIAGNOSIS — M25.50 ARTHRALGIA, UNSPECIFIED JOINT: ICD-10-CM

## 2023-09-26 DIAGNOSIS — K50.10 CROHN'S DISEASE OF LARGE INTESTINE WITHOUT COMPLICATION (HCC): Primary | ICD-10-CM

## 2023-09-26 DIAGNOSIS — K62.5 RECTAL BLEEDING: ICD-10-CM

## 2023-09-26 DIAGNOSIS — R19.7 DIARRHEA, UNSPECIFIED TYPE: ICD-10-CM

## 2023-09-26 DIAGNOSIS — D84.9 IMMUNOCOMPROMISED STATE (HCC): ICD-10-CM

## 2023-09-26 PROCEDURE — 99214 OFFICE O/P EST MOD 30 MIN: CPT | Performed by: INTERNAL MEDICINE

## 2023-09-26 NOTE — PROGRESS NOTES
Virtual Regular Visit    Verification of patient location:    Patient is located at Home in the following state in which I hold an active license [place]      Assessment/Plan:    The patient is a 70-year-old male with Crohn's disease diagnosed at age 10, currently on infliximab for over 15 years (10 mg/kg every 6 weeks) who presents for second opinion as he has been having some breakthrough symptoms around week 5, which was 1 week prior to his next infusion. He has active disease based on clinical symptoms and endoscopic evaluation. Colonoscopy from August 2023 with severe patchy erythematous granular and ulcerated mucosa in the cecum, transverse colon and rectum consistent with Crohn's disease; biopsies with chronic colitis with mild activity in the transverse and mild to moderate activity in the rectum. Recent CMP with chloride 110 and glucose 115 but otherwise normal.  CBC normal.  TSH normal.  CRP was 14.1 in August.  The CRP prior was 6 in November 2022. Prior C. difficile toxin negative. Calprotectin in November 2022 was 411. Most recent infliximab drug level was 10 with no antidrug antibodies back in August 2023.         Problem List Items Addressed This Visit        Other    Crohn's disease ([address]) - Primary    Relevant Orders    CBC and differential    Comprehensive metabolic panel    C-reactive protein    Quantiferon TB Gold Plus Assay    Chronic Hepatitis Panel    MRI enterography w wo   Other Visit Diagnoses     Immunocompromised state ([address])        Rectal bleeding        Relevant Orders    CBC and differential    Comprehensive metabolic panel    C-reactive protein    Quantiferon TB Gold Plus Assay    Chronic Hepatitis Panel    MRI enterography w wo    Diarrhea, unspecified type        Relevant Orders    CBC and differential    Comprehensive metabolic panel    C-reactive protein    Quantiferon TB Gold Plus Assay    Chronic Hepatitis Panel    MRI enterography w wo    Arthralgia, unspecified joint Stop infliximab at this time. Additional treatment options include Stelara versus Skyrizi vs Rinvoq vs Entyvio. Would not favor Entyvio at this time given concern also for joint pains. Will plan to switch to Stelara  Next blood work 11/2023  Next quant gold and hepatitis panel 11/2023    Repeat colonoscopy August 2024  Obtain enterography    Avoid live virus vaccines  Yearly flu shot  COVID vaccine and booster  Pneumonia vaccine  Shingrix  Routine skin exams with the dermatologist         Reason for visit is   Chief Complaint   Patient presents with   • Follow-up     Crohn's fu   • Virtual Regular Visit        Encounter provider Waylon Maaz MD    Provider located at [address] [place] [id number] [phone]      Recent Visits  No visits were found meeting these conditions. Showing recent visits within past 7 days and meeting all other requirements  Today's Visits  Date Type Provider Dept   09/26/23 Telephone Waylon Maza MD [facility] [place]   09/26/23 Telemedicine Waylon Maza MD [facility]   Showing today's visits and meeting all other requirements  Future Appointments  No visits were found meeting these conditions. Showing future appointments within next 150 days and meeting all other requirements       The patient was identified by name and date of birth. Carmen Shrestha was informed that this is a telemedicine visit and that the visit is being conducted through Telephone. My office door was closed. No one else was in the room. He acknowledged consent and understanding of privacy and security of the video platform. The patient has agreed to participate and understands they can discontinue the visit at any time. Patient is aware this is a billable service.      It was my intent to perform this visit via video technology but the patient was not able to do a video connection so the visit was completed via audio telephone only. Subjective  Romayne Arlington is a 34 y.o. male with Crohn's . Marginal. He feels like the Remicade is not as effective as it once was. He was thinking about changing it. Some side effects with joint pains, skin breakdown. On average 5-7 small BMs per day, + tenesmus, liquidy stools but variable  Some intermittent blood maybe once a day but small amount  Some urgency, 3 nocturnal BMs per week, no recent incontinence. No abdominal pain  No rashes, no mouth sores, + joint pains (knees and elbows)  Mild acid reflux but infrequent. No dysphagia, odynophagia, nausea, vomiting  No weight loss. Past Medical History:   Diagnosis Date   • Colon polyp    • Crohn's disease ([address])    • GERD (gastroesophageal reflux disease)     occasion   • History of Crohn's disease    • Lumbar herniated disc        Past Surgical History:   Procedure Laterality Date   • BACK SURGERY      herniated disc surgery   • COLONOSCOPY     • EGD     • WISDOM TOOTH EXTRACTION         Current Outpatient Medications   Medication Sig Dispense Refill   • inFLIXimab (REMICADE) 100 mg Infuse into a venous catheter     • albuterol (Proventil HFA) 90 mcg/act inhaler Inhale 2 puffs every 6 (six) hours as needed for wheezing or shortness of breath (Patient not taking: Reported on 8/15/2023) 8 g 0     No current facility-administered medications for this visit. No Known Allergies    REVIEW OF SYSTEMS:  10 point ROS reviewed and negative, except as above      PHYSICAL EXAMINATION:  Unable to perform physical examination given the unavailability of a video application.      Visit Time  Total Visit Duration: 20

## 2023-09-28 NOTE — TELEPHONE ENCOUNTER
Patients GI provider:  Dr. Yazmin Saldaña    Number to return call: [phone]    Reason for call: Pt's fiance calling Stating patient Rip [employer] Member ID [id number]. She states it doesn't go in effect until midnight, RTE came back could not be processed.     Scheduled procedure/appointment date if applicable: N/A

## 2023-10-02 NOTE — TELEPHONE ENCOUNTER
Could not verify with RTCATY or Mina    Spoke to Sara - asked if he could send a picture of his insurance card so we could look at it and/or call the verification number from the card.

## 2023-10-10 ENCOUNTER — TELEPHONE (OUTPATIENT)
Age: 38
End: 2023-10-10

## 2023-10-10 NOTE — TELEPHONE ENCOUNTER
Patients wife contacted office questioning with Lorna Feliz had an email to email new insurance cards for patient. She was made aware, patient can upload images of insurance cards and update insurance information on his [employer]. She can also contact our office back with the updated insurance information and provide information via telephone.

## 2023-10-12 ENCOUNTER — NURSE TRIAGE (OUTPATIENT)
Age: 38
End: 2023-10-12

## 2023-10-12 DIAGNOSIS — K50.10 CROHN'S DISEASE OF LARGE INTESTINE WITHOUT COMPLICATION (HCC): Primary | ICD-10-CM

## 2023-10-12 DIAGNOSIS — K50.119 CROHN'S DISEASE OF LARGE INTESTINE WITH COMPLICATION (HCC): ICD-10-CM

## 2023-10-12 NOTE — TELEPHONE ENCOUNTER
Pt call transferred to nurses line    Pt last seen 9/26 w/ hx of crohns currently on remicade with plan to switch pt to stelera. Pts jered johnson, called in emotional and tearful on call. She explained that insurance is denying remicade infusion and she is worried about pt. She stated pt is always tired, upset, rectal bleeding, and having constant bms. She expressed he needs his infusion tomorrow. I explained that the IBD team is working on authorization process and are currently on the phone with pts insurance. IBD team member stated she would call pts elizabeth back and further explain.   Please call her back at [phone]

## 2023-10-12 NOTE — TELEPHONE ENCOUNTER
Called patient's significant other, reached voicemail, left message to call back. I connected with the patient. Pt states will get Hep B/TB blood work tomorrow. Jefegeovany Steele has been trying to get in contact with insurance for [employer] authorization. Pt had Remicade 5 weeks ago. Dr. Patti Srinivasan- Pt has noticed an increase in frequency and blood in stool the past 5 days. 12 bowel movements daily with blood covering toilet paper and toilet bowl red. Denies fever, denies abdominal cramping or pain. Pt currently on liquid diet.      Recommended  Liquid diet/low residue/low fiber diet  Stay hydrated  Fecal Calprotectin

## 2023-10-13 ENCOUNTER — APPOINTMENT (OUTPATIENT)
Dept: LAB | Facility: MEDICAL CENTER | Age: 38
End: 2023-10-13

## 2023-10-13 ENCOUNTER — APPOINTMENT (OUTPATIENT)
Dept: LAB | Facility: MEDICAL CENTER | Age: 38
End: 2023-10-13
Payer: COMMERCIAL

## 2023-10-13 ENCOUNTER — TELEPHONE (OUTPATIENT)
Age: 38
End: 2023-10-13

## 2023-10-13 DIAGNOSIS — K62.5 RECTAL BLEEDING: ICD-10-CM

## 2023-10-13 DIAGNOSIS — R19.7 DIARRHEA, UNSPECIFIED TYPE: ICD-10-CM

## 2023-10-13 DIAGNOSIS — K50.10 CROHN'S DISEASE OF LARGE INTESTINE WITHOUT COMPLICATION (HCC): ICD-10-CM

## 2023-10-13 LAB
ALBUMIN SERPL BCP-MCNC: 4 G/DL (ref 3.5–5)
ALP SERPL-CCNC: 95 U/L (ref 34–104)
ALT SERPL W P-5'-P-CCNC: 14 U/L (ref 7–52)
ANION GAP SERPL CALCULATED.3IONS-SCNC: 9 MMOL/L
AST SERPL W P-5'-P-CCNC: 15 U/L (ref 13–39)
BASOPHILS # BLD AUTO: 0.04 THOUSANDS/ÂΜL (ref 0–0.1)
BASOPHILS NFR BLD AUTO: 1 % (ref 0–1)
BILIRUB SERPL-MCNC: 0.54 MG/DL (ref 0.2–1)
BUN SERPL-MCNC: 7 MG/DL (ref 5–25)
CALCIUM SERPL-MCNC: 9 MG/DL (ref 8.4–10.2)
CHLORIDE SERPL-SCNC: 102 MMOL/L (ref 96–108)
CO2 SERPL-SCNC: 26 MMOL/L (ref 21–32)
CREAT SERPL-MCNC: 0.79 MG/DL (ref 0.6–1.3)
CRP SERPL QL: 27.8 MG/L
EOSINOPHIL # BLD AUTO: 0.11 THOUSAND/ÂΜL (ref 0–0.61)
EOSINOPHIL NFR BLD AUTO: 2 % (ref 0–6)
ERYTHROCYTE [DISTWIDTH] IN BLOOD BY AUTOMATED COUNT: 12.3 % (ref 11.6–15.1)
GFR SERPL CREATININE-BSD FRML MDRD: 112 ML/MIN/1.73SQ M
GLUCOSE P FAST SERPL-MCNC: 90 MG/DL (ref 65–99)
HBV CORE AB SER QL: NORMAL
HBV CORE IGM SER QL: NORMAL
HBV SURFACE AG SER QL: NORMAL
HCT VFR BLD AUTO: 47.1 % (ref 36.5–49.3)
HCV AB SER QL: NORMAL
HGB BLD-MCNC: 15.6 G/DL (ref 12–17)
IMM GRANULOCYTES # BLD AUTO: 0.02 THOUSAND/UL (ref 0–0.2)
IMM GRANULOCYTES NFR BLD AUTO: 0 % (ref 0–2)
LYMPHOCYTES # BLD AUTO: 2.1 THOUSANDS/ÂΜL (ref 0.6–4.47)
LYMPHOCYTES NFR BLD AUTO: 32 % (ref 14–44)
MCH RBC QN AUTO: 31.3 PG (ref 26.8–34.3)
MCHC RBC AUTO-ENTMCNC: 33.1 G/DL (ref 31.4–37.4)
MCV RBC AUTO: 94 FL (ref 82–98)
MONOCYTES # BLD AUTO: 0.84 THOUSAND/ÂΜL (ref 0.17–1.22)
MONOCYTES NFR BLD AUTO: 13 % (ref 4–12)
NEUTROPHILS # BLD AUTO: 3.5 THOUSANDS/ÂΜL (ref 1.85–7.62)
NEUTS SEG NFR BLD AUTO: 52 % (ref 43–75)
NRBC BLD AUTO-RTO: 0 /100 WBCS
PLATELET # BLD AUTO: 249 THOUSANDS/UL (ref 149–390)
PMV BLD AUTO: 9 FL (ref 8.9–12.7)
POTASSIUM SERPL-SCNC: 4.5 MMOL/L (ref 3.5–5.3)
PROT SERPL-MCNC: 7.2 G/DL (ref 6.4–8.4)
RBC # BLD AUTO: 4.99 MILLION/UL (ref 3.88–5.62)
SODIUM SERPL-SCNC: 137 MMOL/L (ref 135–147)
WBC # BLD AUTO: 6.61 THOUSAND/UL (ref 4.31–10.16)

## 2023-10-13 PROCEDURE — 83993 ASSAY FOR CALPROTECTIN FECAL: CPT

## 2023-10-13 PROCEDURE — 85025 COMPLETE CBC W/AUTO DIFF WBC: CPT

## 2023-10-13 PROCEDURE — 86705 HEP B CORE ANTIBODY IGM: CPT

## 2023-10-13 PROCEDURE — 86480 TB TEST CELL IMMUN MEASURE: CPT

## 2023-10-13 PROCEDURE — 86140 C-REACTIVE PROTEIN: CPT

## 2023-10-13 PROCEDURE — 36415 COLL VENOUS BLD VENIPUNCTURE: CPT

## 2023-10-13 PROCEDURE — 86803 HEPATITIS C AB TEST: CPT

## 2023-10-13 PROCEDURE — 80053 COMPREHEN METABOLIC PANEL: CPT

## 2023-10-13 PROCEDURE — 87340 HEPATITIS B SURFACE AG IA: CPT

## 2023-10-13 PROCEDURE — 86704 HEP B CORE ANTIBODY TOTAL: CPT

## 2023-10-13 RX ORDER — PREDNISONE 10 MG/1
TABLET ORAL
Qty: 70 TABLET | Refills: 0 | Status: SHIPPED | OUTPATIENT
Start: 2023-10-13 | End: 2023-11-10

## 2023-10-13 NOTE — TELEPHONE ENCOUNTER
10/13 spoke with pt about his insurance and let him know that the prednisone was being sent to [facility] in [address].

## 2023-10-13 NOTE — TELEPHONE ENCOUNTER
Patients GI provider:  Dr. Soto Members    Number to return call: [phone]    Reason for call: Pt calling stating he is returning a call from Trae Avendano @ Dr. Virgilio Bowles office. No note found. Please return his call.     Scheduled procedure/appointment date if applicable: 31/8/43

## 2023-10-14 LAB
GAMMA INTERFERON BACKGROUND BLD IA-ACNC: 0.23 IU/ML
M TB IFN-G BLD-IMP: NEGATIVE
M TB IFN-G CD4+ BCKGRND COR BLD-ACNC: 0.06 IU/ML
M TB IFN-G CD4+ BCKGRND COR BLD-ACNC: 0.09 IU/ML
MITOGEN IGNF BCKGRD COR BLD-ACNC: 9.77 IU/ML

## 2023-10-16 LAB — CALPROTECTIN STL-MCNT: 3430 UG/G (ref 0–120)

## 2023-10-24 ENCOUNTER — TELEPHONE (OUTPATIENT)
Age: 38
End: 2023-10-24

## 2023-10-24 NOTE — TELEPHONE ENCOUNTER
Patients GI provider:  Dr. Phillips Generous    Number to return call: [phone] ext [phone]    Reason for call: Ele Perez, care coordinator from [address] [place] [address], calling in regards to obtaining pt's insurance information for pharmacy. She stated she only has pt info for [facility] and they are not providing medical coverage for Stelara and she would need pt's pharmaceutical insurance. Please return Saloni's call at above number regarding this matter.     Scheduled procedure/appointment date if applicable: Appt: 79/78/1975

## 2023-11-02 ENCOUNTER — TELEPHONE (OUTPATIENT)
Dept: GASTROENTEROLOGY | Facility: CLINIC | Age: 38
End: 2023-11-02

## 2023-11-06 ENCOUNTER — NURSE TRIAGE (OUTPATIENT)
Age: 38
End: 2023-11-06

## 2023-11-06 ENCOUNTER — TELEPHONE (OUTPATIENT)
Age: 38
End: 2023-11-06

## 2023-11-06 ENCOUNTER — TELEPHONE (OUTPATIENT)
Dept: GASTROENTEROLOGY | Facility: CLINIC | Age: 38
End: 2023-11-06

## 2023-11-06 DIAGNOSIS — D84.9 IMMUNOCOMPROMISED STATE (HCC): ICD-10-CM

## 2023-11-06 DIAGNOSIS — K62.5 RECTAL BLEEDING: ICD-10-CM

## 2023-11-06 DIAGNOSIS — K50.119 CROHN'S DISEASE OF LARGE INTESTINE WITH COMPLICATION (HCC): Primary | ICD-10-CM

## 2023-11-06 NOTE — TELEPHONE ENCOUNTER
Forwarding to IBD since other calls addressed today and another call message being addressed [facility] CC/RN

## 2023-11-06 NOTE — TELEPHONE ENCOUNTER
Hi,    I ordered repeat blood work as well as some stool tests. I know he keeps him a few weeks ago but we will specifically check for C. difficile, which was not checked a few weeks ago, as well as look at his white blood cell count. I also ordered blood cultures for him. Would it be okay if he sees his PCP to get tested for any sort of respiratory infection such as COVID or RSV or the flu? Were his symptoms better on higher doses of prednisone? If the C. difficile comes back negative as well as the other infectious studies, we can try to increase the dose a little bit    Thank you!

## 2023-11-06 NOTE — TELEPHONE ENCOUNTER
I spoke with the patient and relayed providers message. Pt will go tomorrow morning for blood/stool tests. Taking tylenol as prescribed for fever. Advised to reach out to PCP for infectious testing. Pt states after decreasing to 30 mg on prednisone taper, frequency and urgency  of bowel movements increased. Aware Dr. Ana Garcia may increase prednisone once infectious studies are negative. Advised to call our office if fever continues or symptoms worsen.

## 2023-11-06 NOTE — TELEPHONE ENCOUNTER
Hx Crohn's  Incoming call from patient's significant other. Pt started with a fever at 9 pm last night. Fever ranging from 102 to most recent 103. Pt has not taken any medication for the fever. Denies cough or congestion or denies sick exposure. Last Inflixmab in September, pending insurance change- awaiting to hear back about Stelara from our office. As per the significant other- Multiple bowel movements daily with urgency. Bleeding moderately. Moderate abdominal pain. Currently on 10 mg prednisone.

## 2023-11-06 NOTE — TELEPHONE ENCOUNTER
Patients GI provider:  Dr. Oscar Hayes    Number to return call: Aime Weiss (Significant Other)     Reason for call: Alfonzo Koyanagi called and requested to speak with RN Ruth Ann Mac regarding Muna Nephew please review and reach out thank you     Scheduled procedure/appointment date if applicable: appt 16/30/2666

## 2023-11-06 NOTE — TELEPHONE ENCOUNTER
Regarding: fever  ----- Message from Marina Kaplan sent at 11/6/2023  2:24 PM EST -----  Patients Significant other called in regarding the patient not having Stelara and experiencing fevers of 102/103. Please return her call with recommendations for the fever.

## 2023-11-07 ENCOUNTER — TELEPHONE (OUTPATIENT)
Age: 38
End: 2023-11-07

## 2023-11-07 NOTE — TELEPHONE ENCOUNTER
Shelly Rang the Care coordinator from [address] [place] [address] called to very if we have received the verification benefits.  Advised we did on 10/30/2023 thank you

## 2023-11-10 ENCOUNTER — APPOINTMENT (OUTPATIENT)
Dept: LAB | Facility: MEDICAL CENTER | Age: 38
End: 2023-11-10
Payer: COMMERCIAL

## 2023-11-10 ENCOUNTER — TELEPHONE (OUTPATIENT)
Dept: FAMILY MEDICINE CLINIC | Facility: CLINIC | Age: 38
End: 2023-11-10

## 2023-11-10 DIAGNOSIS — K62.5 RECTAL BLEEDING: ICD-10-CM

## 2023-11-10 DIAGNOSIS — K50.119 CROHN'S DISEASE OF LARGE INTESTINE WITH COMPLICATION (HCC): Primary | ICD-10-CM

## 2023-11-10 DIAGNOSIS — D84.9 IMMUNOCOMPROMISED STATE (HCC): ICD-10-CM

## 2023-11-10 DIAGNOSIS — K50.119 CROHN'S DISEASE OF LARGE INTESTINE WITH COMPLICATION (HCC): ICD-10-CM

## 2023-11-10 LAB
ALBUMIN SERPL BCP-MCNC: 3.7 G/DL (ref 3.5–5)
ALP SERPL-CCNC: 77 U/L (ref 34–104)
ALT SERPL W P-5'-P-CCNC: 13 U/L (ref 7–52)
ANION GAP SERPL CALCULATED.3IONS-SCNC: 7 MMOL/L
AST SERPL W P-5'-P-CCNC: 13 U/L (ref 13–39)
BASOPHILS # BLD AUTO: 0.05 THOUSANDS/ÂΜL (ref 0–0.1)
BASOPHILS NFR BLD AUTO: 0 % (ref 0–1)
BILIRUB SERPL-MCNC: 0.59 MG/DL (ref 0.2–1)
BUN SERPL-MCNC: 7 MG/DL (ref 5–25)
CALCIUM SERPL-MCNC: 9 MG/DL (ref 8.4–10.2)
CHLORIDE SERPL-SCNC: 94 MMOL/L (ref 96–108)
CO2 SERPL-SCNC: 28 MMOL/L (ref 21–32)
CREAT SERPL-MCNC: 0.77 MG/DL (ref 0.6–1.3)
CRP SERPL QL: 176.8 MG/L
EOSINOPHIL # BLD AUTO: 0.08 THOUSAND/ÂΜL (ref 0–0.61)
EOSINOPHIL NFR BLD AUTO: 1 % (ref 0–6)
ERYTHROCYTE [DISTWIDTH] IN BLOOD BY AUTOMATED COUNT: 12 % (ref 11.6–15.1)
GFR SERPL CREATININE-BSD FRML MDRD: 113 ML/MIN/1.73SQ M
GLUCOSE P FAST SERPL-MCNC: 112 MG/DL (ref 65–99)
HCT VFR BLD AUTO: 42.5 % (ref 36.5–49.3)
HGB BLD-MCNC: 14.4 G/DL (ref 12–17)
IMM GRANULOCYTES # BLD AUTO: 0.08 THOUSAND/UL (ref 0–0.2)
IMM GRANULOCYTES NFR BLD AUTO: 1 % (ref 0–2)
LYMPHOCYTES # BLD AUTO: 1.91 THOUSANDS/ÂΜL (ref 0.6–4.47)
LYMPHOCYTES NFR BLD AUTO: 16 % (ref 14–44)
MCH RBC QN AUTO: 30.8 PG (ref 26.8–34.3)
MCHC RBC AUTO-ENTMCNC: 33.9 G/DL (ref 31.4–37.4)
MCV RBC AUTO: 91 FL (ref 82–98)
MONOCYTES # BLD AUTO: 1.22 THOUSAND/ÂΜL (ref 0.17–1.22)
MONOCYTES NFR BLD AUTO: 10 % (ref 4–12)
NEUTROPHILS # BLD AUTO: 9 THOUSANDS/ÂΜL (ref 1.85–7.62)
NEUTS SEG NFR BLD AUTO: 72 % (ref 43–75)
NRBC BLD AUTO-RTO: 0 /100 WBCS
PLATELET # BLD AUTO: 276 THOUSANDS/UL (ref 149–390)
PMV BLD AUTO: 8.6 FL (ref 8.9–12.7)
POTASSIUM SERPL-SCNC: 3.8 MMOL/L (ref 3.5–5.3)
PROT SERPL-MCNC: 7.5 G/DL (ref 6.4–8.4)
RBC # BLD AUTO: 4.67 MILLION/UL (ref 3.88–5.62)
SODIUM SERPL-SCNC: 129 MMOL/L (ref 135–147)
WBC # BLD AUTO: 12.34 THOUSAND/UL (ref 4.31–10.16)

## 2023-11-10 PROCEDURE — 36415 COLL VENOUS BLD VENIPUNCTURE: CPT

## 2023-11-10 PROCEDURE — 87040 BLOOD CULTURE FOR BACTERIA: CPT

## 2023-11-10 PROCEDURE — 85025 COMPLETE CBC W/AUTO DIFF WBC: CPT

## 2023-11-10 PROCEDURE — 86140 C-REACTIVE PROTEIN: CPT

## 2023-11-10 PROCEDURE — 80053 COMPREHEN METABOLIC PANEL: CPT

## 2023-11-10 NOTE — TELEPHONE ENCOUNTER
Pt's significant other Vallery Alcon called asking to speak with Lillian Garcia regarding medication programs.

## 2023-11-10 NOTE — TELEPHONE ENCOUNTER
Connected with Kate Lynch via phone. She spoke of calling the free drug program yesterday evening and them saying that they did not receive a page of the packet for maintenance dosing. I reviewed that we did send this page per previous documentation. She asked if Joselyn Mckeon could reach back out to them. Kate Lynch went on to speak of the patient's symptoms and was unable to answer all my questions and wanted me to speak with the patient. I connected with the patient via phone. He c/o of the following:  -Fever of 102-103 during the night, started when he ended Pred. Last night he described having to change the sheets due to how much he was sweating.  -Bms: approximately 18-20x within a 24hr period, he is waking q hour nightly  -Minimal blood with some bms, he is noticing mucus  -For the last 4 days he has only been able to have Ensure and hasn't had a full meal within 2 weeks  -Abd pain is "here and there" and nothing substantial  -Lethargic  -Reported skin tenting    Patient cannot go to the ER per my recommendation. I reminded him of the recommendations made by the provider on 11/6. He agreed to the labs and calling the PCP. Dr. Dick Hartman: Do you have any additional recommendations?

## 2023-11-10 NOTE — TELEPHONE ENCOUNTER
Hi,    Ideally he should go to the ER for evaluation. I want him to get the stool tests and blood work prior to starting prednisone if possible. Can he do the labs this weekend?  Once negative we can consider steroids again

## 2023-11-10 NOTE — TELEPHONE ENCOUNTER
Patients significant other called in and stated patient is having Crohns disease flare ups and the GI doctor is ordering a bunch of labs. They told her to contact us to order us any additional testing we may feel necessary. He is having fevers with no other symptoms. Diarrhea with blood stools off and on. He is in the bathroom often. Sweating at night to the point the sheets are wet. The GI doctor order testing already for him to complete later today, if there anything you would like to add to that? All orders in chart. Please advise.

## 2023-11-10 NOTE — TELEPHONE ENCOUNTER
Connected with the patient's vm and left a message indicating that I'll be calling Debra Salcido with Dr. Floyd Tomas recommendations. Connected with Debra Salcido and relayed Dr. Prince Payment communication. She reported that Eduardo So will go after work to the lab and seems unwilling to go to the ED. She also called Melvin Garza and confirmed that they received all the paperwork.

## 2023-11-11 ENCOUNTER — NURSE TRIAGE (OUTPATIENT)
Dept: OTHER | Facility: OTHER | Age: 38
End: 2023-11-11

## 2023-11-11 NOTE — TELEPHONE ENCOUNTER
Reason for Disposition   [1] Caller requesting NON-URGENT health information AND [2] PCP's office is the best resource    Protocols used: Information Only Call - No Triage-ADULT-AH    Recommended to f/u with the office Monday regarding lab work. Also explained if it was recommended pt go to ED, he should go to ED.

## 2023-11-11 NOTE — TELEPHONE ENCOUNTER
Regarding: Lab Result Question  ----- Message from Maisha Hensley sent at 11/11/2023 10:58 AM EST -----  " My significant other has has lots of symptoms the last few days and the Dr recommended he go to the ER. His lab work came back and it is very elevated I wasn't sure if someone can go over it with him.  Maybe it will give him a push to go to the ED"

## 2023-11-12 LAB — BACTERIA BLD CULT: NORMAL

## 2023-11-13 ENCOUNTER — TELEPHONE (OUTPATIENT)
Age: 38
End: 2023-11-13

## 2023-11-13 ENCOUNTER — TELEPHONE (OUTPATIENT)
Dept: GASTROENTEROLOGY | Facility: AMBULARY SURGERY CENTER | Age: 38
End: 2023-11-13

## 2023-11-13 DIAGNOSIS — K50.119 CROHN'S DISEASE OF LARGE INTESTINE WITH COMPLICATION (HCC): Primary | ICD-10-CM

## 2023-11-13 DIAGNOSIS — K62.5 RECTAL BLEEDING: ICD-10-CM

## 2023-11-13 RX ORDER — PREDNISONE 10 MG/1
TABLET ORAL
Qty: 70 TABLET | Refills: 0 | Status: SHIPPED | OUTPATIENT
Start: 2023-11-13 | End: 2023-12-11

## 2023-11-13 NOTE — TELEPHONE ENCOUNTER
Patients GI provider:  Dr. Gunnar Juárez    Number to return call: [phone]     Reason for call: Pts significant other Bishnu Arreaga called and wanted to see if  has review the labs patient did on Friday please review she also she stated she did speak with the [facility] and they will be shipping medication for patient first infusion thank you     Scheduled procedure/appointment date if applicable: Appt/ 45/21/4817

## 2023-11-13 NOTE — TELEPHONE ENCOUNTER
I spoke with the patient. Relayed results and Dr. Maykel Carter will also review and send a message. Discussed importance of obtaining stool samples as soon as possible, pt states will try to provide samples today.

## 2023-11-13 NOTE — TELEPHONE ENCOUNTER
Called and spoke with the patient. CRP significantly elevated. Discussed results. Fevers less over the last 2 nights. Less blood. He stopped eating for 5 days. He feels like he has to go but nothing comes out. + tenesmus. He felt better on the prednisone at higher doses.    He is afraid to eat    Complete stool tests  Will start prednisone to help him feel better and bridge to stelara  Awaiting initiation of Stelara

## 2023-11-14 ENCOUNTER — APPOINTMENT (OUTPATIENT)
Dept: LAB | Facility: MEDICAL CENTER | Age: 38
End: 2023-11-14
Payer: COMMERCIAL

## 2023-11-14 DIAGNOSIS — D84.9 IMMUNOCOMPROMISED STATE (HCC): ICD-10-CM

## 2023-11-14 DIAGNOSIS — K62.5 RECTAL BLEEDING: ICD-10-CM

## 2023-11-14 DIAGNOSIS — K50.119 CROHN'S DISEASE OF LARGE INTESTINE WITH COMPLICATION (HCC): ICD-10-CM

## 2023-11-14 PROCEDURE — 87505 NFCT AGENT DETECTION GI: CPT

## 2023-11-14 PROCEDURE — 87493 C DIFF AMPLIFIED PROBE: CPT

## 2023-11-14 PROCEDURE — 87177 OVA AND PARASITES SMEARS: CPT

## 2023-11-14 PROCEDURE — 83993 ASSAY FOR CALPROTECTIN FECAL: CPT

## 2023-11-14 PROCEDURE — 87209 SMEAR COMPLEX STAIN: CPT

## 2023-11-14 NOTE — TELEPHONE ENCOUNTER
Jessika called from [employer] to speak with Pedro Celaya.  Please call jessika back [phone]

## 2023-11-14 NOTE — TELEPHONE ENCOUNTER
Idris Nguyen called requesting a call back from Chloe Oakes in regards to status of paperwork. She states Corina Brewer is stating different things.

## 2023-11-14 NOTE — TELEPHONE ENCOUNTER
Pts s/o Dena Ramirez calling in, she wanted to know if provider was able to see results of stools studies, I advised they are still pending. She also asked if any medications were sent in for him. I advised prednisone taper was sent yesterday to his [facility] pharmacy. She will pick this up for him.

## 2023-11-15 LAB
BACTERIA BLD CULT: NORMAL
C DIFF TOX GENS STL QL NAA+PROBE: NEGATIVE
CAMPYLOBACTER DNA SPEC NAA+PROBE: NORMAL
SALMONELLA DNA SPEC QL NAA+PROBE: NORMAL
SHIGA TOXIN STX GENE SPEC NAA+PROBE: NORMAL
SHIGELLA DNA SPEC QL NAA+PROBE: NORMAL

## 2023-11-17 LAB — CALPROTECTIN STL-MCNT: 4490 UG/G (ref 0–120)

## 2023-11-27 ENCOUNTER — TELEPHONE (OUTPATIENT)
Age: 38
End: 2023-11-27

## 2023-11-27 NOTE — TELEPHONE ENCOUNTER
Patients GI provider:  Dr. Ash Pryor    Number to return call: [phone] [facility]    Reason for call:Jenny from [facility] Patient Assistance program calling in for a prior Auth. Kristopher Pollard is requesting to speck with cristian. Kristopher Pollard can be reached at the number above, thank you.     Scheduled procedure/appointment date if applicable: Apt/procedure 12/05/2023

## 2023-11-28 ENCOUNTER — TRANSCRIBE ORDERS (OUTPATIENT)
Dept: GASTROENTEROLOGY | Facility: CLINIC | Age: 38
End: 2023-11-28

## 2023-11-28 NOTE — TELEPHONE ENCOUNTER
Lori Nageotte from [place] Patient Assistance program calling in for a prior Auth of [place] provider listed on page 6 is DR Florencia Muse she wants to make sure that is the correct provider that should be listed for this patient

## 2023-11-28 NOTE — TELEPHONE ENCOUNTER
Briana Waite from [employer] contacted office stating that she is going to run a new benefit investigation on patients insurance due to new doctor listed on page 6 as per previous documentation.

## 2023-12-04 NOTE — TELEPHONE ENCOUNTER
Sha Franco the fiance of the pt was calling to speak to Abby regarding the [facility].  Please reach out  at [phone] too discuss

## 2023-12-04 NOTE — TELEPHONE ENCOUNTER
12/4 she also sent a [employer] message, stating Haig Naren and Mitzy couldn't find the form they did online, I faxed it to them .

## 2023-12-04 NOTE — TELEPHONE ENCOUNTER
Rep from [id number] called said there was information missing from the script on the application - strength needs to be changed to 130mg, and they need a route and directions, she said updated version can be sent over, questions call - [phone]

## 2023-12-05 NOTE — TELEPHONE ENCOUNTER
Pt. Shanthi Romo called back asking that we send the missing information that was requested by Urmila Alfaro, pt. Has been without medication for 3 moths, ptRadha Boyle is asking that someone call her once information is sent to Chantal Vivas and [employer], any questions please call [phone]

## 2023-12-07 ENCOUNTER — HOSPITAL ENCOUNTER (EMERGENCY)
Facility: HOSPITAL | Age: 38
Discharge: HOME | End: 2023-12-07
Attending: EMERGENCY MEDICINE
Payer: COMMERCIAL

## 2023-12-07 ENCOUNTER — NURSE TRIAGE (OUTPATIENT)
Age: 38
End: 2023-12-07

## 2023-12-07 ENCOUNTER — APPOINTMENT (EMERGENCY)
Dept: CT IMAGING | Facility: HOSPITAL | Age: 38
End: 2023-12-07
Payer: COMMERCIAL

## 2023-12-07 VITALS
TEMPERATURE: 97.9 F | DIASTOLIC BLOOD PRESSURE: 89 MMHG | HEART RATE: 112 BPM | SYSTOLIC BLOOD PRESSURE: 155 MMHG | RESPIRATION RATE: 18 BRPM | OXYGEN SATURATION: 99 %

## 2023-12-07 DIAGNOSIS — K50.119 CROHN'S DISEASE OF COLON WITH COMPLICATION (HCC): Primary | ICD-10-CM

## 2023-12-07 DIAGNOSIS — K50.10 CROHN'S COLITIS (HCC): Primary | ICD-10-CM

## 2023-12-07 LAB
ALBUMIN SERPL BCP-MCNC: 3.2 G/DL (ref 3.5–5)
ALP SERPL-CCNC: 87 U/L (ref 34–104)
ALT SERPL W P-5'-P-CCNC: 11 U/L (ref 7–52)
ANION GAP SERPL CALCULATED.3IONS-SCNC: 8 MMOL/L
AST SERPL W P-5'-P-CCNC: 8 U/L (ref 13–39)
BACTERIA UR QL AUTO: NORMAL /HPF
BASOPHILS # BLD AUTO: 0.04 THOUSANDS/ÂΜL (ref 0–0.1)
BASOPHILS NFR BLD AUTO: 0 % (ref 0–1)
BILIRUB SERPL-MCNC: 0.64 MG/DL (ref 0.2–1)
BILIRUB UR QL STRIP: NEGATIVE
BUN SERPL-MCNC: 5 MG/DL (ref 5–25)
CALCIUM ALBUM COR SERPL-MCNC: 9.2 MG/DL (ref 8.3–10.1)
CALCIUM SERPL-MCNC: 8.6 MG/DL (ref 8.4–10.2)
CHLORIDE SERPL-SCNC: 97 MMOL/L (ref 96–108)
CLARITY UR: CLEAR
CO2 SERPL-SCNC: 25 MMOL/L (ref 21–32)
COLOR UR: ABNORMAL
CREAT SERPL-MCNC: 0.64 MG/DL (ref 0.6–1.3)
EOSINOPHIL # BLD AUTO: 0.01 THOUSAND/ÂΜL (ref 0–0.61)
EOSINOPHIL NFR BLD AUTO: 0 % (ref 0–6)
ERYTHROCYTE [DISTWIDTH] IN BLOOD BY AUTOMATED COUNT: 12.1 % (ref 11.6–15.1)
FLUAV RNA RESP QL NAA+PROBE: NEGATIVE
FLUBV RNA RESP QL NAA+PROBE: NEGATIVE
GFR SERPL CREATININE-BSD FRML MDRD: 122 ML/MIN/1.73SQ M
GLUCOSE SERPL-MCNC: 112 MG/DL (ref 65–140)
GLUCOSE UR STRIP-MCNC: NEGATIVE MG/DL
HCT VFR BLD AUTO: 34.9 % (ref 36.5–49.3)
HGB BLD-MCNC: 12 G/DL (ref 12–17)
HGB UR QL STRIP.AUTO: NEGATIVE
IMM GRANULOCYTES # BLD AUTO: 0.08 THOUSAND/UL (ref 0–0.2)
IMM GRANULOCYTES NFR BLD AUTO: 1 % (ref 0–2)
KETONES UR STRIP-MCNC: NEGATIVE MG/DL
LEUKOCYTE ESTERASE UR QL STRIP: NEGATIVE
LIPASE SERPL-CCNC: <6 U/L (ref 11–82)
LYMPHOCYTES # BLD AUTO: 1.21 THOUSANDS/ÂΜL (ref 0.6–4.47)
LYMPHOCYTES NFR BLD AUTO: 8 % (ref 14–44)
MCH RBC QN AUTO: 30.4 PG (ref 26.8–34.3)
MCHC RBC AUTO-ENTMCNC: 34.4 G/DL (ref 31.4–37.4)
MCV RBC AUTO: 88 FL (ref 82–98)
MONOCYTES # BLD AUTO: 1.28 THOUSAND/ÂΜL (ref 0.17–1.22)
MONOCYTES NFR BLD AUTO: 8 % (ref 4–12)
NEUTROPHILS # BLD AUTO: 13.11 THOUSANDS/ÂΜL (ref 1.85–7.62)
NEUTS SEG NFR BLD AUTO: 83 % (ref 43–75)
NITRITE UR QL STRIP: NEGATIVE
NON-SQ EPI CELLS URNS QL MICRO: NORMAL /HPF
NRBC BLD AUTO-RTO: 0 /100 WBCS
PH UR STRIP.AUTO: 6.5 [PH]
PLATELET # BLD AUTO: 255 THOUSANDS/UL (ref 149–390)
PMV BLD AUTO: 7.9 FL (ref 8.9–12.7)
POTASSIUM SERPL-SCNC: 3.9 MMOL/L (ref 3.5–5.3)
PROT SERPL-MCNC: 6.9 G/DL (ref 6.4–8.4)
PROT UR STRIP-MCNC: ABNORMAL MG/DL
RBC # BLD AUTO: 3.95 MILLION/UL (ref 3.88–5.62)
RBC #/AREA URNS AUTO: NORMAL /HPF
RSV RNA RESP QL NAA+PROBE: NEGATIVE
SARS-COV-2 RNA RESP QL NAA+PROBE: NEGATIVE
SODIUM SERPL-SCNC: 130 MMOL/L (ref 135–147)
SP GR UR STRIP.AUTO: >=1.05 (ref 1–1.03)
UROBILINOGEN UR STRIP-ACNC: <2 MG/DL
WBC # BLD AUTO: 15.73 THOUSAND/UL (ref 4.31–10.16)
WBC #/AREA URNS AUTO: NORMAL /HPF

## 2023-12-07 PROCEDURE — 85025 COMPLETE CBC W/AUTO DIFF WBC: CPT | Performed by: EMERGENCY MEDICINE

## 2023-12-07 PROCEDURE — 96361 HYDRATE IV INFUSION ADD-ON: CPT

## 2023-12-07 PROCEDURE — 83690 ASSAY OF LIPASE: CPT | Performed by: EMERGENCY MEDICINE

## 2023-12-07 PROCEDURE — 0241U HB NFCT DS VIR RESP RNA 4 TRGT: CPT | Performed by: EMERGENCY MEDICINE

## 2023-12-07 PROCEDURE — 96374 THER/PROPH/DIAG INJ IV PUSH: CPT

## 2023-12-07 PROCEDURE — 80053 COMPREHEN METABOLIC PANEL: CPT | Performed by: EMERGENCY MEDICINE

## 2023-12-07 PROCEDURE — 99285 EMERGENCY DEPT VISIT HI MDM: CPT | Performed by: EMERGENCY MEDICINE

## 2023-12-07 PROCEDURE — 99285 EMERGENCY DEPT VISIT HI MDM: CPT

## 2023-12-07 PROCEDURE — G1004 CDSM NDSC: HCPCS

## 2023-12-07 PROCEDURE — 36415 COLL VENOUS BLD VENIPUNCTURE: CPT | Performed by: EMERGENCY MEDICINE

## 2023-12-07 PROCEDURE — 74177 CT ABD & PELVIS W/CONTRAST: CPT

## 2023-12-07 PROCEDURE — 81001 URINALYSIS AUTO W/SCOPE: CPT | Performed by: EMERGENCY MEDICINE

## 2023-12-07 RX ORDER — METHYLPREDNISOLONE SODIUM SUCCINATE 125 MG/2ML
125 INJECTION, POWDER, LYOPHILIZED, FOR SOLUTION INTRAMUSCULAR; INTRAVENOUS ONCE
Status: DISCONTINUED | OUTPATIENT
Start: 2023-12-07 | End: 2023-12-07

## 2023-12-07 RX ORDER — KETOROLAC TROMETHAMINE 30 MG/ML
15 INJECTION, SOLUTION INTRAMUSCULAR; INTRAVENOUS ONCE
Status: COMPLETED | OUTPATIENT
Start: 2023-12-07 | End: 2023-12-07

## 2023-12-07 RX ORDER — PREDNISONE 10 MG/1
10 TABLET ORAL DAILY
Qty: 30 TABLET | Refills: 4 | Status: SHIPPED | OUTPATIENT
Start: 2023-12-07 | End: 2024-05-05

## 2023-12-07 RX ORDER — PREDNISONE 20 MG/1
40 TABLET ORAL ONCE
Status: COMPLETED | OUTPATIENT
Start: 2023-12-07 | End: 2023-12-07

## 2023-12-07 RX ORDER — PREDNISONE 5 MG/1
TABLET ORAL
Qty: 98 TABLET | Refills: 0 | Status: SHIPPED | OUTPATIENT
Start: 2023-12-07 | End: 2023-12-07

## 2023-12-07 RX ADMIN — PREDNISONE 40 MG: 20 TABLET ORAL at 15:10

## 2023-12-07 RX ADMIN — IOHEXOL 100 ML: 350 INJECTION, SOLUTION INTRAVENOUS at 13:34

## 2023-12-07 RX ADMIN — KETOROLAC TROMETHAMINE 15 MG: 30 INJECTION, SOLUTION INTRAMUSCULAR; INTRAVENOUS at 14:00

## 2023-12-07 RX ADMIN — SODIUM CHLORIDE 1000 ML: 0.9 INJECTION, SOLUTION INTRAVENOUS at 12:21

## 2023-12-07 NOTE — ED PROVIDER NOTES
History  Chief Complaint   Patient presents with    Abdominal Pain     Pt reports generalized abdominal pain, hx of crohn's and colitis. Pt states he hasn't been able to get his meds for 4 months due to insurance. Bloody stools, vomiting     68-year-old male, history of Crohn's colitis, presents to the emergency room with abdominal pain and hematochezia. Patient reports that he has a longstanding history of Crohn's colitis and follows with a GI doctor in [place] ([place]). States that he was on Remicade for over 18 years but had stopped it 4 months ago and has had plans to start Stelara. However, he has had difficulty with getting his insurance to approve the medication. He states that he was on steroids but had completed the course 2 weeks ago. He states that he is currently not on any medication for his Crohn's colitis at this time. He states that since yesterday he has had worsening abdominal pain and bloody stools. States that this is significantly worse than prior Crohn's exacerbations. He denies any nausea/vomiting, fever/chills, chest pain, shortness of breath, or urinary symptoms. No other complaints. He denies any thinners or aspirin use. History provided by:  Patient  Abdominal Pain  Pain location:  Generalized  Pain radiates to:  Does not radiate  Pain severity:  Moderate  Onset quality:  Sudden  Timing:  Constant  Progression:  Worsening  Chronicity:  Recurrent  Context: not previous surgeries and not sick contacts    Relieved by:  None tried  Worsened by:  Nothing  Ineffective treatments:  None tried  Associated symptoms: hematochezia    Associated symptoms: no chest pain, no chills, no cough, no diarrhea, no dysuria, no fever, no hematuria, no nausea, no shortness of breath, no sore throat and no vomiting        Prior to Admission Medications   Prescriptions Last Dose Informant Patient Reported? Taking?    albuterol (Proventil HFA) 90 mcg/act inhaler  Self No No   Sig: Inhale 2 puffs every 6 (six) hours as needed for wheezing or shortness of breath   Patient not taking: Reported on 8/15/2023   predniSONE 10 mg tablet   No No   Sig: Take 4 tablets (40 mg total) by mouth daily for 7 days, THEN 3 tablets (30 mg total) daily for 7 days, THEN 2 tablets (20 mg total) daily for 7 days, THEN 1 tablet (10 mg total) daily for 7 days. predniSONE 10 mg tablet   No No   Sig: Take 1 tablet (10 mg total) by mouth daily 4 po daily for 1 week, 3 po daily for 1 week, 2 po daily for 1 week, 1 po daily for 1 week. Facility-Administered Medications: None       Past Medical History:   Diagnosis Date    Colon polyp     Crohn's disease ([address])     GERD (gastroesophageal reflux disease)     occasion    History of Crohn's disease     Lumbar herniated disc        Past Surgical History:   Procedure Laterality Date    BACK SURGERY      herniated disc surgery    COLONOSCOPY      EGD      WISDOM TOOTH EXTRACTION         Family History   Problem Relation Age of Onset    Heart attack Father     Diabetes Father     Neuropathy Father     Kidney disease Father     Cancer Maternal Uncle     Colon cancer Maternal Uncle      I have reviewed and agree with the history as documented. E-Cigarette/Vaping    E-Cigarette Use Never User      E-Cigarette/Vaping Substances    Nicotine No     THC No     CBD No     Flavoring No     Other No     Unknown No      Social History     Tobacco Use    Smoking status: Every Day     Packs/day: 1.00     Types: Cigarettes    Smokeless tobacco: Never   Vaping Use    Vaping Use: Never used   Substance Use Topics    Alcohol use: Yes     Comment: occasional    Drug use: Not Currently       Review of Systems   Constitutional:  Negative for chills and fever. HENT:  Negative for congestion, ear pain and sore throat. Eyes:  Negative for pain and visual disturbance. Respiratory:  Negative for cough, shortness of breath and wheezing. Cardiovascular:  Negative for chest pain and leg swelling.    Gastrointestinal: Positive for abdominal pain, blood in stool and hematochezia. Negative for diarrhea, nausea and vomiting. Genitourinary:  Negative for dysuria, frequency, hematuria and urgency. Musculoskeletal:  Negative for neck pain and neck stiffness. Skin:  Negative for rash and wound. Neurological:  Negative for weakness, numbness and headaches. Psychiatric/Behavioral:  Negative for agitation and confusion. All other systems reviewed and are negative. Physical Exam  Physical Exam  Vitals and nursing note reviewed. Constitutional:       Appearance: He is well-developed. HENT:      Head: Normocephalic and atraumatic. Eyes:      Pupils: Pupils are equal, round, and reactive to light. Cardiovascular:      Rate and Rhythm: Normal rate and regular rhythm. Pulmonary:      Effort: Pulmonary effort is normal.      Breath sounds: Normal breath sounds. Abdominal:      General: Bowel sounds are normal.      Palpations: Abdomen is soft. Tenderness: There is generalized abdominal tenderness. Musculoskeletal:         General: Normal range of motion. Cervical back: Normal range of motion and neck supple. Skin:     General: Skin is warm and dry. Neurological:      General: No focal deficit present. Mental Status: He is alert and oriented to person, place, and time.       Comments: No focal deficits         Vital Signs  ED Triage Vitals [12/07/23 1034]   Temperature Pulse Respirations Blood Pressure SpO2   97.9 °F (36.6 °C) (!) 112 18 155/89 99 %      Temp Source Heart Rate Source Patient Position - Orthostatic VS BP Location FiO2 (%)   Oral Monitor Sitting Left arm --      Pain Score       7           Vitals:    12/07/23 1034   BP: 155/89   Pulse: (!) 112   Patient Position - Orthostatic VS: Sitting         Visual Acuity      ED Medications  Medications   sodium chloride 0.9 % bolus 1,000 mL (0 mL Intravenous Stopped 12/7/23 1431)   ketorolac (TORADOL) injection 15 mg (15 mg Intravenous Given 12/7/23 1400)   iohexol (OMNIPAQUE) 350 MG/ML injection (MULTI-DOSE) 100 mL (100 mL Intravenous Given 12/7/23 1334)   predniSONE tablet 40 mg (40 mg Oral Given 12/7/23 1510)       Diagnostic Studies  Results Reviewed       Procedure Component Value Units Date/Time    Urine Microscopic [[id number]]  (Normal) Collected: 12/07/23 1450    Lab Status: Final result Specimen: Urine, Clean Catch Updated: 12/07/23 1513     RBC, UA 1-2 /hpf      WBC, UA None Seen /hpf      Epithelial Cells Occasional /hpf      Bacteria, UA None Seen /hpf     UA w Reflex to Microscopic w Reflex to Culture [[id number]]  (Abnormal) Collected: 12/07/23 1450    Lab Status: Final result Specimen: Urine, Clean Catch Updated: 12/07/23 1511     Color, UA Light Yellow     Clarity, UA Clear     Specific Gravity, UA >=1.050     pH, UA 6.5     Leukocytes, UA Negative     Nitrite, UA Negative     Protein, UA 50 (1+) mg/dl      Glucose, UA Negative mg/dl      Ketones, UA Negative mg/dl      Urobilinogen, UA <2.0 mg/dl      Bilirubin, UA Negative     Occult Blood, UA Negative    CMP [[id number]]  (Abnormal) Collected: 12/07/23 1222    Lab Status: Final result Specimen: Blood from Arm, Right Updated: 12/07/23 1325     Sodium 130 mmol/L      Potassium 3.9 mmol/L      Chloride 97 mmol/L      CO2 25 mmol/L      ANION GAP 8 mmol/L      BUN 5 mg/dL      Creatinine 0.64 mg/dL      Glucose 112 mg/dL      Calcium 8.6 mg/dL      Corrected Calcium 9.2 mg/dL      AST 8 U/L      ALT 11 U/L      Alkaline Phosphatase 87 U/L      Total Protein 6.9 g/dL      Albumin 3.2 g/dL      Total Bilirubin 0.64 mg/dL      eGFR 122 ml/min/1.73sq m     Narrative:      [place] guidelines for Chronic Kidney Disease (CKD):     Stage 1 with normal or high GFR (GFR > 90 mL/min/1.73 square meters)    Stage 2 Mild CKD (GFR = 60-89 mL/min/1.73 square meters)    Stage 3A Moderate CKD (GFR = 45-59 mL/min/1.73 square meters)    Stage 3B Moderate CKD (GFR = 30-44 mL/min/1.73 square meters)    Stage 4 Severe CKD (GFR = 15-29 mL/min/1.73 square meters)    Stage 5 End Stage CKD (GFR <15 mL/min/1.73 square meters)  Note: GFR calculation is accurate only with a steady state creatinine    Lipase [[id number]]  (Abnormal) Collected: 12/07/23 1222    Lab Status: Final result Specimen: Blood from Arm, Right Updated: 12/07/23 1325     Lipase <6 u/L     FLU/RSV/COVID - if FLU/RSV clinically relevant [[id number]]  (Normal) Collected: 12/07/23 1222    Lab Status: Final result Specimen: Nares from Nose Updated: 12/07/23 1315     SARS-CoV-2 Negative     INFLUENZA A PCR Negative     INFLUENZA B PCR Negative     RSV PCR Negative    Narrative:      FOR PEDIATRIC PATIENTS - copy/paste COVID Guidelines URL to browser: [email or web address]    SARS-CoV-2 assay is a Nucleic Acid Amplification assay intended for the  qualitative detection of nucleic acid from SARS-CoV-2 in nasopharyngeal  swabs. Results are for the presumptive identification of SARS-CoV-2 RNA. Positive results are indicative of infection with SARS-CoV-2, the virus  causing COVID-19, but do not rule out bacterial infection or co-infection  with other viruses. Laboratories within the [facility] and its  territories are required to report all positive results to the appropriate  public health authorities. Negative results do not preclude SARS-CoV-2  infection and should not be used as the sole basis for treatment or other  patient management decisions. Negative results must be combined with  clinical observations, patient history, and epidemiological information. This test has not been FDA cleared or approved. This test has been authorized by FDA under an Emergency Use Authorization  (EUA).  This test is only authorized for the duration of time the  declaration that circumstances exist justifying the authorization of the  emergency use of an in vitro diagnostic tests for detection of SARS-CoV-2  virus and/or diagnosis of COVID-19 infection under section 564(b)(1) of  the Act, 21 U. S.C. 178VAO-2(F)(7), unless the authorization is terminated  or revoked sooner. The test has been validated but independent review by FDA  and CLIA is pending. Test performed using [employer] GeneXpert: This RT-PCR assay targets N2,  a region unique to SARS-CoV-2. A conserved region in the E-gene was chosen  for pan-Sarbecovirus detection which includes SARS-CoV-2. According to CMS-2020-01-R, this platform meets the definition of high-throughput technology. CBC and differential [[id number]]  (Abnormal) Collected: 12/07/23 1222    Lab Status: Final result Specimen: Blood from Arm, Right Updated: 12/07/23 1227     WBC 15.73 Thousand/uL      RBC 3.95 Million/uL      Hemoglobin 12.0 g/dL      Hematocrit 34.9 %      MCV 88 fL      MCH 30.4 pg      MCHC 34.4 g/dL      RDW 12.1 %      MPV 7.9 fL      Platelets 896 Thousands/uL      nRBC 0 /100 WBCs      Neutrophils Relative 83 %      Immat GRANS % 1 %      Lymphocytes Relative 8 %      Monocytes Relative 8 %      Eosinophils Relative 0 %      Basophils Relative 0 %      Neutrophils Absolute 13.11 Thousands/µL      Immature Grans Absolute 0.08 Thousand/uL      Lymphocytes Absolute 1.21 Thousands/µL      Monocytes Absolute 1.28 Thousand/µL      Eosinophils Absolute 0.01 Thousand/µL      Basophils Absolute 0.04 Thousands/µL                    CT abdomen pelvis with contrast   Final Result by Eliezer Mata MD (12/07 1433)      Findings consistent with acute exacerbation of known Crohn's disease involving the terminal ileum and entire colon extending to the rectum. No evidence for intra-abdominal abscess. No evidence for bowel obstruction. The study was marked in [facility] for immediate notification.             Workstation performed: [id number]                    Procedures  Procedures         ED Course  ED Course as of 12/07/23 1517   Thu Dec 07, 2023   1510 Spoke with Beau List from GI- made aware of patient and findings. Recommends giving prednisone taper. She states that she will order the prescription. Patient instructed to follow-up with his GI doctor as an outpatient. Medical Decision Making  33 y/o male with abd pain and bloody stools. Will get labs, UA, and ct abd/pel. Will reassess. Labs, CT scan reviewed. Patient with acute exacerbation of Crohn's colitis. Spoke with Beau Carrillo from GI- made aware of patient and findings. Recommends giving prednisone taper. She states that she will order the prescription. Patient instructed to follow-up with his GI doctor as an outpatient. Amount and/or Complexity of Data Reviewed  Labs: ordered. Radiology: ordered. Risk  Prescription drug management. Disposition  Final diagnoses:   Crohn's colitis ([address])     Time reflects when diagnosis was documented in both MDM as applicable and the Disposition within this note       Time User Action Codes Description Comment    12/7/2023  2:50 PM Vitaly REYES Add [K50.10] Crohn's colitis [facility])           ED Disposition       ED Disposition   Discharge    Condition   Stable    Date/Time   Thu Dec 7, 2023  2:50 PM    Comment   [address] discharge to home/self care. Follow-up Information       Follow up With Specialties Details Why Contact Info Additional Information    your GI doctor  Call in 1 day for follow up next week      [facility] Emergency Department Emergency Medicine Go to  immediately for any new or worsening symptoms [address] [facility] Emergency Department, [place], [place], [address]            Discharge Medication List as of 12/7/2023  2:59 PM        START taking these medications    Details   ! ! predniSONE 10 mg tablet Take 1 tablet (10 mg total) by mouth daily 4 po daily for 1 week, 3 po daily for 1 week, 2 po daily for 1 week, 1 po daily for 1 week., Starting Thu 12/7/2023, Until Sun 5/5/2024, Normal       !! - Potential duplicate medications found. Please discuss with provider. CONTINUE these medications which have NOT CHANGED    Details   albuterol (Proventil HFA) 90 mcg/act inhaler Inhale 2 puffs every 6 (six) hours as needed for wheezing or shortness of breath, Starting Mon 10/24/2022, Normal      !! predniSONE 10 mg tablet Multiple Dosages:Starting Mon 11/13/2023, Until Sun 11/19/2023 at 2359, THEN Starting Mon 11/20/2023, Until Sun 11/26/2023 at 2359, THEN Starting Mon 11/27/2023, Until Sun 12/3/2023 at 2359, THEN Starting Mon 12/4/2023, Until Sun 12/10/2023 at 2359T agatha 4 tablets (40 mg total) by mouth daily for 7 days, THEN 3 tablets (30 mg total) daily for 7 days, THEN 2 tablets (20 mg total) daily for 7 days, THEN 1 tablet (10 mg total) daily for 7 days. , Normal       !! - Potential duplicate medications found. Please discuss with provider. No discharge procedures on file.     PDMP Review       None            ED Provider  Electronically Signed by             Rodolfo Pan DO  12/07/23 4285

## 2023-12-07 NOTE — TELEPHONE ENCOUNTER
Alisson Johnston called to report pt in ED and unsure whether pt will be ok in time for Stelara infusion. Wanted to make GI provider aware. Reason for Disposition   Information only question and nurse able to answer    Answer Assessment - Initial Assessment Questions  1. REASON FOR CALL or QUESTION: "What is your reason for calling today?" or "How can I best help you?" or "What question do you have that I can help answer?"    Alisson Johnston called to report pt in ED and unsure whether pt will be ok in time for Stelara infusion. Want to make GI Provider aware. Protocols used:  Information Only Call - No Triage-ADULT-[place]

## 2023-12-07 NOTE — TELEPHONE ENCOUNTER
12/7 yes I Just got him scheduled for it, I can't move it up though as we are waiting for drug to be shipped.

## 2023-12-08 DIAGNOSIS — R19.7 DIARRHEA, UNSPECIFIED TYPE: ICD-10-CM

## 2023-12-08 DIAGNOSIS — K50.119 CROHN'S DISEASE OF LARGE INTESTINE WITH COMPLICATION (HCC): Primary | ICD-10-CM

## 2023-12-08 RX ORDER — DICYCLOMINE HYDROCHLORIDE 10 MG/1
10 CAPSULE ORAL 3 TIMES DAILY PRN
Qty: 90 CAPSULE | Refills: 0 | Status: SHIPPED | OUTPATIENT
Start: 2023-12-08

## 2023-12-08 NOTE — TELEPHONE ENCOUNTER
I spoke with the patient and relayed provider sent bentyl to [facility] in [address].

## 2023-12-12 ENCOUNTER — TELEPHONE (OUTPATIENT)
Dept: INFUSION CENTER | Facility: CLINIC | Age: 38
End: 2023-12-12

## 2023-12-12 NOTE — TELEPHONE ENCOUNTER
Pt called, LM with a reminder of appt date and time, department policies/ procedures reviewed with pt. Infusion d ept call back number left in message.

## 2023-12-14 ENCOUNTER — HOSPITAL ENCOUNTER (OUTPATIENT)
Dept: INFUSION CENTER | Facility: CLINIC | Age: 38
Discharge: HOME | End: 2023-12-14

## 2023-12-14 VITALS
DIASTOLIC BLOOD PRESSURE: 67 MMHG | SYSTOLIC BLOOD PRESSURE: 121 MMHG | RESPIRATION RATE: 18 BRPM | BODY MASS INDEX: 25.49 KG/M2 | HEART RATE: 76 BPM | TEMPERATURE: 98 F | WEIGHT: 172.6 LBS

## 2023-12-14 DIAGNOSIS — K50.819 CROHN'S DISEASE OF SMALL AND LARGE INTESTINES WITH COMPLICATION (HCC): Primary | ICD-10-CM

## 2023-12-14 PROCEDURE — 96365 THER/PROPH/DIAG IV INF INIT: CPT

## 2023-12-14 RX ORDER — SODIUM CHLORIDE 9 MG/ML
20 INJECTION, SOLUTION INTRAVENOUS ONCE
Status: COMPLETED | OUTPATIENT
Start: 2023-12-14 | End: 2023-12-14

## 2023-12-14 RX ORDER — SODIUM CHLORIDE 9 MG/ML
20 INJECTION, SOLUTION INTRAVENOUS ONCE
Status: CANCELLED | OUTPATIENT
Start: 2023-12-14

## 2023-12-14 RX ADMIN — USTEKINUMAB 520 MG: 130 SOLUTION INTRAVENOUS at 11:32

## 2023-12-14 RX ADMIN — SODIUM CHLORIDE 20 ML/HR: 0.9 INJECTION, SOLUTION INTRAVENOUS at 11:33

## 2023-12-14 NOTE — PROGRESS NOTES
Pt presents for stelara offering no compliants. Pt stated no recent infection or antibiotic treatment. Pt tolerated treatment without incident. PIV removed. AVS declined. Pt will continue with injections at home. No more treatments scheduled at this time.

## 2024-04-19 ENCOUNTER — TELEPHONE (OUTPATIENT)
Age: 39
End: 2024-04-19

## 2024-04-19 NOTE — TELEPHONE ENCOUNTER
Patients GI provider:  Dr Jonas    Number to return call: [phone]    Reason for call: Jas from [facility] called because pt is not returning their calls. He states the pt does not have any active insurance, and they have been trying to reach him for months about his IV remicade. Ajs states they are going to discharge the pt.    Scheduled procedure/appointment date if applicable: Appt 7/9/24

## 2024-04-22 NOTE — TELEPHONE ENCOUNTER
4/22 emailed Leticia at [facility], she will make a note to stop calling the pt. This was discussed back in October of 2023.

## 2024-06-03 ENCOUNTER — OFFICE VISIT (OUTPATIENT)
Dept: FAMILY MEDICINE CLINIC | Facility: CLINIC | Age: 39
End: 2024-06-03
Payer: COMMERCIAL

## 2024-06-03 ENCOUNTER — LAB (OUTPATIENT)
Dept: LAB | Facility: CLINIC | Age: 39
End: 2024-06-03
Payer: COMMERCIAL

## 2024-06-03 VITALS
OXYGEN SATURATION: 99 % | TEMPERATURE: 97.2 F | HEIGHT: 69 IN | BODY MASS INDEX: 25.45 KG/M2 | SYSTOLIC BLOOD PRESSURE: 120 MMHG | RESPIRATION RATE: 18 BRPM | DIASTOLIC BLOOD PRESSURE: 70 MMHG | WEIGHT: 171.8 LBS | HEART RATE: 70 BPM

## 2024-06-03 DIAGNOSIS — R53.82 CHRONIC FATIGUE: ICD-10-CM

## 2024-06-03 DIAGNOSIS — K50.119 CROHN'S DISEASE OF LARGE INTESTINE WITH COMPLICATION (HCC): ICD-10-CM

## 2024-06-03 DIAGNOSIS — Z13.220 SCREENING, LIPID: ICD-10-CM

## 2024-06-03 DIAGNOSIS — I10 ESSENTIAL HYPERTENSION: ICD-10-CM

## 2024-06-03 DIAGNOSIS — J45.20 MILD INTERMITTENT REACTIVE AIRWAY DISEASE WITHOUT COMPLICATION: ICD-10-CM

## 2024-06-03 DIAGNOSIS — I10 ESSENTIAL HYPERTENSION: Primary | ICD-10-CM

## 2024-06-03 LAB
25(OH)D3 SERPL-MCNC: 22 NG/ML (ref 30–100)
ALBUMIN SERPL BCP-MCNC: 3.6 G/DL (ref 3.5–5)
ALP SERPL-CCNC: 106 U/L (ref 34–104)
ALT SERPL W P-5'-P-CCNC: 12 U/L (ref 7–52)
ANION GAP SERPL CALCULATED.3IONS-SCNC: 6 MMOL/L (ref 4–13)
AST SERPL W P-5'-P-CCNC: 18 U/L (ref 13–39)
BASOPHILS # BLD AUTO: 0.03 THOUSANDS/ÂΜL (ref 0–0.1)
BASOPHILS NFR BLD AUTO: 0 % (ref 0–1)
BILIRUB SERPL-MCNC: 0.27 MG/DL (ref 0.2–1)
BUN SERPL-MCNC: 6 MG/DL (ref 5–25)
CALCIUM SERPL-MCNC: 9.1 MG/DL (ref 8.4–10.2)
CHLORIDE SERPL-SCNC: 104 MMOL/L (ref 96–108)
CHOLEST SERPL-MCNC: 95 MG/DL
CO2 SERPL-SCNC: 26 MMOL/L (ref 21–32)
CREAT SERPL-MCNC: 0.66 MG/DL (ref 0.6–1.3)
EOSINOPHIL # BLD AUTO: 0.06 THOUSAND/ÂΜL (ref 0–0.61)
EOSINOPHIL NFR BLD AUTO: 1 % (ref 0–6)
ERYTHROCYTE [DISTWIDTH] IN BLOOD BY AUTOMATED COUNT: 14.4 % (ref 11.6–15.1)
GFR SERPL CREATININE-BSD FRML MDRD: 120 ML/MIN/1.73SQ M
GLUCOSE P FAST SERPL-MCNC: 83 MG/DL (ref 65–99)
HCT VFR BLD AUTO: 40.9 % (ref 36.5–49.3)
HDLC SERPL-MCNC: 41 MG/DL
HGB BLD-MCNC: 13 G/DL (ref 12–17)
IMM GRANULOCYTES # BLD AUTO: 0.05 THOUSAND/UL (ref 0–0.2)
IMM GRANULOCYTES NFR BLD AUTO: 1 % (ref 0–2)
LDLC SERPL CALC-MCNC: 44 MG/DL (ref 0–100)
LYMPHOCYTES # BLD AUTO: 2.01 THOUSANDS/ÂΜL (ref 0.6–4.47)
LYMPHOCYTES NFR BLD AUTO: 21 % (ref 14–44)
MCH RBC QN AUTO: 27.4 PG (ref 26.8–34.3)
MCHC RBC AUTO-ENTMCNC: 31.8 G/DL (ref 31.4–37.4)
MCV RBC AUTO: 86 FL (ref 82–98)
MONOCYTES # BLD AUTO: 0.8 THOUSAND/ÂΜL (ref 0.17–1.22)
MONOCYTES NFR BLD AUTO: 8 % (ref 4–12)
NEUTROPHILS # BLD AUTO: 6.78 THOUSANDS/ÂΜL (ref 1.85–7.62)
NEUTS SEG NFR BLD AUTO: 69 % (ref 43–75)
NRBC BLD AUTO-RTO: 0 /100 WBCS
PLATELET # BLD AUTO: 389 THOUSANDS/UL (ref 149–390)
PMV BLD AUTO: 9.7 FL (ref 8.9–12.7)
POTASSIUM SERPL-SCNC: 4.3 MMOL/L (ref 3.5–5.3)
PROT SERPL-MCNC: 7.3 G/DL (ref 6.4–8.4)
RBC # BLD AUTO: 4.75 MILLION/UL (ref 3.88–5.62)
SODIUM SERPL-SCNC: 136 MMOL/L (ref 135–147)
TRIGL SERPL-MCNC: 48 MG/DL
TSH SERPL DL<=0.05 MIU/L-ACNC: 1.13 UIU/ML (ref 0.45–4.5)
VIT B12 SERPL-MCNC: 271 PG/ML (ref 180–914)
WBC # BLD AUTO: 9.73 THOUSAND/UL (ref 4.31–10.16)

## 2024-06-03 PROCEDURE — 36415 COLL VENOUS BLD VENIPUNCTURE: CPT

## 2024-06-03 PROCEDURE — 82607 VITAMIN B-12: CPT

## 2024-06-03 PROCEDURE — 82306 VITAMIN D 25 HYDROXY: CPT

## 2024-06-03 PROCEDURE — 85025 COMPLETE CBC W/AUTO DIFF WBC: CPT

## 2024-06-03 PROCEDURE — 84443 ASSAY THYROID STIM HORMONE: CPT

## 2024-06-03 PROCEDURE — 80053 COMPREHEN METABOLIC PANEL: CPT

## 2024-06-03 PROCEDURE — 80061 LIPID PANEL: CPT

## 2024-06-03 PROCEDURE — 99214 OFFICE O/P EST MOD 30 MIN: CPT | Performed by: NURSE PRACTITIONER

## 2024-06-03 PROCEDURE — 86618 LYME DISEASE ANTIBODY: CPT

## 2024-06-03 RX ORDER — ALBUTEROL SULFATE 90 UG/1
2 AEROSOL, METERED RESPIRATORY (INHALATION) EVERY 6 HOURS PRN
Qty: 8 G | Refills: 0 | Status: SHIPPED | OUTPATIENT
Start: 2024-06-03

## 2024-06-03 RX ORDER — ALBUTEROL SULFATE 90 UG/1
2 AEROSOL, METERED RESPIRATORY (INHALATION) EVERY 6 HOURS PRN
Qty: 8 G | Refills: 0 | Status: SHIPPED | OUTPATIENT
Start: 2024-06-03 | End: 2024-06-03 | Stop reason: SDUPTHER

## 2024-06-03 NOTE — PROGRESS NOTES
"OFFICE VISIT  Declan Aguero 39 y.o. male MRN: [id number]          Assessment / Plan:  Problem List Items Addressed This Visit          Cardiovascular and Mediastinum    Essential hypertension - Primary     Stable, on no blood pressure medication.  Smoking cessation advised         Relevant Orders    CBC and differential    Comprehensive metabolic panel       Other    Crohn's disease (HCC)     Currently on Stelara, following GI         Chronic fatigue     Requires workup         Relevant Orders    TSH, 3rd generation with Free T4 reflex    Vitamin D 25 hydroxy    Lyme Total AB W Reflex to IGM/IGG    Vitamin B12     Other Visit Diagnoses       Screening, lipid        Relevant Orders    Lipid Panel with Direct LDL reflex    Mild intermittent reactive airway disease without complication        Relevant Medications    albuterol (Proventil HFA) 90 mcg/act inhaler              Reason For Visit / Chief Complaint  Chief Complaint   Patient presents with    Fatigue     A lot looking for labs         HPI:  Declan Aguero is a 39 y.o. male who presents today for fatigue, Has been ongoing for over one year. He reports waking at hs, multiple times, does get sleep 6 hours, fragmented. He does have know crohns. He does have frequent bowels movements. He is on stelara, some improvement. Was on remicade in the past, felt better although reports \"fading off:\" Appetite okay, consumes water. Is employed, working construction     Historical Information   Past Medical History:   Diagnosis Date    Colon polyp     Crohn's disease (HCC)     GERD (gastroesophageal reflux disease)     occasion    History of Crohn's disease     Lumbar herniated disc      Past Surgical History:   Procedure Laterality Date    BACK SURGERY      herniated disc surgery    COLONOSCOPY      EGD      WISDOM TOOTH EXTRACTION       Social History   Social History     Substance and Sexual Activity   Alcohol Use Yes    Comment: occasional     Social History     Substance " and Sexual Activity   Drug Use Not Currently     Social History     Tobacco Use   Smoking Status Every Day    Current packs/day: 1.00    Average packs/day: 1 pack/day for 25.4 years (25.4 ttl pk-yrs)    Types: Cigarettes    Start date: 1999   Smokeless Tobacco Never     Family History   Problem Relation Age of Onset    Heart attack Father     Diabetes Father     Neuropathy Father     Kidney disease Father     Cancer Maternal Uncle     Colon cancer Maternal Uncle        Meds/Allergies   No Known Allergies    Meds:    Current Outpatient Medications:     albuterol (Proventil HFA) 90 mcg/act inhaler, Inhale 2 puffs every 6 (six) hours as needed for wheezing or shortness of breath, Disp: 8 g, Rfl: 0    ustekinumab (Stelara) 90 mg/mL subcutaneous injection, 90 mg subcutaneously every 56 days., Disp: 1 mL, Rfl: 5    dicyclomine (BENTYL) 10 mg capsule, Take 1 capsule (10 mg total) by mouth 3 (three) times a day as needed (abdominal pain and cramping) (Patient not taking: Reported on 6/3/2024), Disp: 90 capsule, Rfl: 0      REVIEW OF SYSTEMS  Review of Systems   Constitutional:  Positive for activity change and fatigue. Negative for chills and fever.   HENT:  Negative for congestion, ear discharge, ear pain, sinus pressure, sinus pain, sore throat, tinnitus and trouble swallowing.    Eyes:  Negative for photophobia, pain, discharge, itching and visual disturbance.   Respiratory:  Negative for cough, chest tightness, shortness of breath and wheezing.    Cardiovascular:  Negative for chest pain and leg swelling.   Gastrointestinal:  Negative for abdominal distention, abdominal pain, constipation, diarrhea, nausea and vomiting.   Endocrine: Negative for polydipsia, polyphagia and polyuria.   Genitourinary:  Negative for dysuria and frequency.   Musculoskeletal:  Negative for arthralgias, myalgias, neck pain and neck stiffness.   Skin:  Negative for color change.   Neurological:  Negative for dizziness, syncope, weakness,  "numbness and headaches.   Hematological:  Does not bruise/bleed easily.   Psychiatric/Behavioral:  Negative for behavioral problems, confusion, self-injury, sleep disturbance and suicidal ideas. The patient is not nervous/anxious.            Current Vitals:   Blood Pressure: 120/70 (06/03/24 0818)  Pulse: 70 (06/03/24 0818)  Temperature: (!) 97.2 °F (36.2 °C) (06/03/24 0818)  Temp Source: Tympanic (06/03/24 0818)  Respirations: 18 (06/03/24 0818)  Height: 5' 9\" (175.3 cm) (06/03/24 0818)  Weight - Scale: 77.9 kg (171 lb 12.8 oz) (06/03/24 0818)  SpO2: 99 % (06/03/24 0818)  [unfilled]    PHYSICAL EXAMS:  Physical Exam  Vitals and nursing note reviewed.   Constitutional:       Appearance: Normal appearance.   HENT:      Head: Normocephalic and atraumatic.   Cardiovascular:      Rate and Rhythm: Normal rate and regular rhythm.      Pulses: Normal pulses.      Heart sounds: Normal heart sounds.   Pulmonary:      Breath sounds: Wheezing present.   Musculoskeletal:         General: Normal range of motion.   Skin:     General: Skin is warm.   Neurological:      Mental Status: He is alert and oriented to person, place, and time.   Psychiatric:         Mood and Affect: Mood normal.         Behavior: Behavior normal.         Thought Content: Thought content normal.             Lab, imaging and other studies: I have personally reviewed pertinent reports.  .             "

## 2024-06-04 DIAGNOSIS — E55.9 VITAMIN D DEFICIENCY: Primary | ICD-10-CM

## 2024-06-04 LAB — B BURGDOR IGG+IGM SER QL IA: NEGATIVE

## 2024-06-04 RX ORDER — ERGOCALCIFEROL 1.25 MG/1
50000 CAPSULE ORAL WEEKLY
Qty: 12 CAPSULE | Refills: 0 | Status: SHIPPED | OUTPATIENT
Start: 2024-06-04

## 2024-06-04 NOTE — RESULT ENCOUNTER NOTE
Please let him know I reviewed his current labs.  His vitamin D is low which is requiring a prescription dose replacement.  This was sent to his pharmacy.  Advise starting vit d replacement  which could be causing his fatigue.  He will be on this for once a week for the next 12 weeks.  Can follow-up with his primary at next office visit if symptoms are not improving can be seen sooner

## 2024-07-09 ENCOUNTER — OFFICE VISIT (OUTPATIENT)
Dept: GASTROENTEROLOGY | Facility: CLINIC | Age: 39
End: 2024-07-09
Payer: COMMERCIAL

## 2024-07-09 ENCOUNTER — APPOINTMENT (OUTPATIENT)
Dept: LAB | Facility: MEDICAL CENTER | Age: 39
End: 2024-07-09
Payer: COMMERCIAL

## 2024-07-09 VITALS
HEIGHT: 69 IN | HEART RATE: 80 BPM | SYSTOLIC BLOOD PRESSURE: 129 MMHG | WEIGHT: 166.6 LBS | BODY MASS INDEX: 24.68 KG/M2 | DIASTOLIC BLOOD PRESSURE: 84 MMHG

## 2024-07-09 DIAGNOSIS — K50.119 CROHN'S DISEASE OF LARGE INTESTINE WITH COMPLICATION (HCC): ICD-10-CM

## 2024-07-09 DIAGNOSIS — K50.119 CROHN'S DISEASE OF LARGE INTESTINE WITH COMPLICATION (HCC): Primary | ICD-10-CM

## 2024-07-09 LAB
ALBUMIN SERPL BCG-MCNC: 3.6 G/DL (ref 3.5–5)
ALP SERPL-CCNC: 125 U/L (ref 34–104)
ALT SERPL W P-5'-P-CCNC: 14 U/L (ref 7–52)
AST SERPL W P-5'-P-CCNC: 16 U/L (ref 13–39)
BILIRUB DIRECT SERPL-MCNC: 0.02 MG/DL (ref 0–0.2)
BILIRUB SERPL-MCNC: 0.29 MG/DL (ref 0.2–1)
CRP SERPL QL: 49.6 MG/L
PROT SERPL-MCNC: 7.8 G/DL (ref 6.4–8.4)

## 2024-07-09 PROCEDURE — 80076 HEPATIC FUNCTION PANEL: CPT

## 2024-07-09 PROCEDURE — 36415 COLL VENOUS BLD VENIPUNCTURE: CPT

## 2024-07-09 PROCEDURE — 80299 QUANTITATIVE ASSAY DRUG: CPT

## 2024-07-09 PROCEDURE — 99214 OFFICE O/P EST MOD 30 MIN: CPT | Performed by: PHYSICIAN ASSISTANT

## 2024-07-09 PROCEDURE — 82397 CHEMILUMINESCENT ASSAY: CPT

## 2024-07-09 PROCEDURE — 86140 C-REACTIVE PROTEIN: CPT

## 2024-07-09 NOTE — PROGRESS NOTES
[facility] - Outpatient Follow-up Note  Declan Aguero 39 y.o. male MRN: [id number]  Encounter: [id number]          ASSESSMENT AND PLAN:      1. Crohn's disease of large intestine with complication (HCC)  Patient with Crohn's disease, he has had persistent symptoms with bowel frequency and weight loss.  Has been on Stelara injections at the beginning of this year after initial induction infusion. will check fecal calprotectin,  last month was performed in November of last year which was greater than 4000 and he reports this was prior to initiating the Stelara.  Check CRP check  Stelara antibody, and refer back to IBD.  Can consider increasing frequency of Stelara to q. 28 days pending lab results or may need transition to another biologic.  He will follow-up with us later this year after IBD re-consultation.   Advised him to get the enterography to evaluate for any small bowel involvement.  - Ustekinumab and Anti-Ustek Ab; Future  - C-reactive protein; Future  - Calprotectin,Fecal; Future  - Hepatic function panel; Future  - MRI enterography w wo; Future    ______________________________________________________________________    SUBJECTIVE:      39-year-old male who presents for routine follow-up.  Does have history of longstanding Crohn's disease since age 14.  Was transitioned from Remicade to Stelara and he states that he is on his fifth or sixth injection of Stelara.  He reports that he still has not been feeling well and having about 5 bowel movements a night and he goes about every 2 hours during the day.  This has not increased but has been essentially this way since he has been on the Stelara although he noted improvement as he was off the medication for about 4 months totally.  He apparently is not eating much and had telephone encounter with our IBD specialist and had blood work last year which CRP was significantly elevated and did have recent blood work from his family doctor  although this did not include inflammatory marker.  He reports that he did the Stelara last week and he is doing this every 56 days.  He reports that he has no recent travel or any antibiotic use.  Denies any recent increase in symptoms as above this has been how he has been feeling.  Denies any significant abdominal pain or bleeding. MR enterography was ordered last year but patient was not aware that that should be done.  Colonoscopy performed last year showing severe patchy edematous granular and ulcerated mucosa erosion and loss of folds with cecum transverse colon and rectum consistent with Crohn's.  Patient was noted to have chronic colitis with mild to moderate activity on pathology was recommended colonoscopy in 3 years.            REVIEW OF SYSTEMS IS OTHERWISE NEGATIVE.      Historical Information   Past Medical History:   Diagnosis Date    Colon polyp     Crohn's disease (HCC)     GERD (gastroesophageal reflux disease)     occasion    History of Crohn's disease     Lumbar herniated disc      Past Surgical History:   Procedure Laterality Date    BACK SURGERY      herniated disc surgery    COLONOSCOPY      EGD      WISDOM TOOTH EXTRACTION       Social History   Social History     Substance and Sexual Activity   Alcohol Use Yes    Comment: occasional     Social History     Substance and Sexual Activity   Drug Use Not Currently     Social History     Tobacco Use   Smoking Status Every Day    Current packs/day: 1.00    Average packs/day: 1 pack/day for 25.5 years (25.5 ttl pk-yrs)    Types: Cigarettes    Start date: 1999   Smokeless Tobacco Never     Family History   Problem Relation Age of Onset    Heart attack Father     Diabetes Father     Neuropathy Father     Kidney disease Father     Cancer Maternal Uncle     Colon cancer Maternal Uncle        Meds/Allergies       Current Outpatient Medications:     albuterol (Proventil HFA) 90 mcg/act inhaler    dicyclomine (BENTYL) 10 mg capsule    ergocalciferol  "(VITAMIN D2) 50,000 units    ustekinumab (Stelara) 90 mg/mL subcutaneous injection    No Known Allergies        Objective     Blood pressure 129/84, pulse 80, height 5' 9\" (1.753 m), weight 75.6 kg (166 lb 9.6 oz). Body mass index is 24.6 kg/m².      PHYSICAL EXAM:      General Appearance:   Alert, cooperative, no distress   HEENT:   Normocephalic, atraumatic, anicteric.     Neck:  Supple, symmetrical, trachea midline   Lungs:   Clear to auscultation bilaterally; no rales, rhonchi or wheezing; respirations unlabored    Heart::   Regular rate and rhythm; no murmur, rub, or gallop.   Abdomen:   Soft, non-tender, non-distended; normal bowel sounds; no masses, no organomegaly    Genitalia:   Deferred    Rectal:   Deferred    Extremities:  No cyanosis, clubbing or edema    Pulses:  2+ and symmetric    Skin:  No jaundice, rashes, or lesions    Lymph nodes:  No palpable cervical lymphadenopathy        Lab Results:   No visits with results within 1 Day(s) from this visit.   Latest known visit with results is:   Lab on 06/03/2024   Component Date Value    WBC 06/03/2024 9.73     RBC 06/03/2024 4.75     Hemoglobin 06/03/2024 13.0     Hematocrit 06/03/2024 40.9     MCV 06/03/2024 86     MCH 06/03/2024 27.4     MCHC 06/03/2024 31.8     RDW 06/03/2024 14.4     MPV 06/03/2024 9.7     Platelets 06/03/2024 389     nRBC 06/03/2024 0     Segmented % 06/03/2024 69     Immature Grans % 06/03/2024 1     Lymphocytes % 06/03/2024 21     Monocytes % 06/03/2024 8     Eosinophils Relative 06/03/2024 1     Basophils Relative 06/03/2024 0     Absolute Neutrophils 06/03/2024 6.78     Absolute Immature Grans 06/03/2024 0.05     Absolute Lymphocytes 06/03/2024 2.01     Absolute Monocytes 06/03/2024 0.80     Eosinophils Absolute 06/03/2024 0.06     Basophils Absolute 06/03/2024 0.03     Sodium 06/03/2024 136     Potassium 06/03/2024 4.3     Chloride 06/03/2024 104     CO2 06/03/2024 26     ANION GAP 06/03/2024 6     BUN 06/03/2024 6     Creatinine " 06/03/2024 0.66     Glucose, Fasting 06/03/2024 83     Calcium 06/03/2024 9.1     AST 06/03/2024 18     ALT 06/03/2024 12     Alkaline Phosphatase 06/03/2024 106 (H)     Total Protein 06/03/2024 7.3     Albumin 06/03/2024 3.6     Total Bilirubin 06/03/2024 0.27     eGFR 06/03/2024 120     TSH 3RD GENERATON 06/03/2024 1.134     Vit D, 25-Hydroxy 06/03/2024 22.0 (L)     Vitamin B-12 06/03/2024 271     Cholesterol 06/03/2024 95     Triglycerides 06/03/2024 48     HDL, Direct 06/03/2024 41     LDL Calculated 06/03/2024 44     Lyme Total Antibodies 06/03/2024 Negative          Radiology Results:   No results found.

## 2024-07-10 DIAGNOSIS — R74.8 ELEVATED ALKALINE PHOSPHATASE LEVEL: Primary | ICD-10-CM

## 2024-07-12 ENCOUNTER — APPOINTMENT (OUTPATIENT)
Dept: LAB | Facility: MEDICAL CENTER | Age: 39
End: 2024-07-12
Payer: COMMERCIAL

## 2024-07-12 DIAGNOSIS — K50.119 CROHN'S DISEASE OF LARGE INTESTINE WITH COMPLICATION (HCC): ICD-10-CM

## 2024-07-12 PROCEDURE — 83993 ASSAY FOR CALPROTECTIN FECAL: CPT

## 2024-07-15 LAB — CALPROTECTIN STL-MCNC: 3170 ÂΜG/G

## 2024-07-16 DIAGNOSIS — K50.119 CROHN'S DISEASE OF COLON WITH COMPLICATION (HCC): Primary | ICD-10-CM

## 2024-07-16 RX ORDER — PREDNISONE 10 MG/1
TABLET ORAL
Qty: 70 TABLET | Refills: 0 | Status: SHIPPED | OUTPATIENT
Start: 2024-07-16 | End: 2024-08-08 | Stop reason: SDUPTHER

## 2024-07-17 LAB
USTEKINUMAB AB SERPL IA-MCNC: <40 NG/ML
USTEKINUMAB SERPL IA-MCNC: 5.8 UG/ML

## 2024-07-29 ENCOUNTER — TELEPHONE (OUTPATIENT)
Dept: GASTROENTEROLOGY | Facility: CLINIC | Age: 39
End: 2024-07-29

## 2024-07-29 ENCOUNTER — TELEMEDICINE (OUTPATIENT)
Dept: GASTROENTEROLOGY | Facility: CLINIC | Age: 39
End: 2024-07-29
Payer: COMMERCIAL

## 2024-07-29 DIAGNOSIS — D84.9 IMMUNOCOMPROMISED STATE (HCC): ICD-10-CM

## 2024-07-29 DIAGNOSIS — R15.2 FECAL URGENCY: ICD-10-CM

## 2024-07-29 DIAGNOSIS — K50.818 CROHN'S DISEASE OF BOTH SMALL AND LARGE INTESTINE WITH OTHER COMPLICATION (HCC): Primary | ICD-10-CM

## 2024-07-29 DIAGNOSIS — R19.7 DIARRHEA, UNSPECIFIED TYPE: ICD-10-CM

## 2024-07-29 PROCEDURE — 99442 PR PHYS/QHP TELEPHONE EVALUATION 11-20 MIN: CPT | Performed by: INTERNAL MEDICINE

## 2024-07-29 NOTE — PROGRESS NOTES
Virtual Regular Visit  Name: Declan Aguero      : 1985      MRN: [id number]  Encounter Provider: Lizbeth Limon MD  Encounter Date: 2024   Encounter department: [facility]    Verification of patient location:    Patient is located at Home in the following state in which I hold an active license [place]    Assessment & Plan   1. Crohn's disease of both small and large intestine with other complication (HCC)  -     C-reactive protein; Future  -     CBC and differential; Future  -     Comprehensive metabolic panel; Future  -     Ustekinumab and Anti-Ustek Ab; Future  -     Quantiferon TB Gold Plus Assay; Future  -     Chronic Hepatitis Panel; Future  2. Immunocompromised state (HCC)  -     C-reactive protein; Future  -     CBC and differential; Future  -     Comprehensive metabolic panel; Future  -     Ustekinumab and Anti-Ustek Ab; Future  3. Diarrhea, unspecified type  4. Fecal urgency      The patient is a 39-year-old man with longstanding Crohn's disease since age 14, ileocolonic, previously on Remicade 10 mg/kg every 6 weeks for 15 years and subsequently on Stelara, who now presents for follow-up. His disease remains moderate to severely active. He has some response to the stelara but still not in remission.     Colonoscopy from 2023 with severe erythematous, granular, ulcerated mucosa in the cecum, transverse, rectum.  Biopsies with chronic colitis with mild to moderate activity    CT abdomen pelvis from 2023 consistent with acute exacerbation of Crohn's disease involving the terminal ileum and colon.    Most recent LFTs with elevated alk phos.  CMP with elevated alk phos but normal electrolytes.  Vitamin D low at 22 and B12 within normal at 271.  CBC normal.  TSH normal.  CRP elevated at 49.6.  Lyme total antibody negative.  Calprotectin greater than 3000.  He had a Stelara level 5.8 and no antidrug antibodies but this was done 2 weeks  after injection        Complete prednisone taper  We can try to get it approved for every 4-week dosing.   We do have other options that we can consider including things such as Humira Skyrizi or Rinvoq.  Repeat blood work ordered today  Repeat quant gold and hepatitis panel October 2024    Obtain MR enterography  Repeat colonoscopy potentially in 6 months    Continue vitamin D supplementation  Continue dicyclomine as needed    Avoid live virus vaccines  Yearly flu shot  COVID vaccine and booster  Pneumonia vaccine  Shingrix  Routine skin exams with the dermatologist      Encounter provider Lizbeth Limon MD    The patient was identified by name and date of birth. Declan Aguero was informed that this is a telemedicine visit and that the visit is being conducted through Telephone.  My office door was closed. No one else was in the room.  He acknowledged consent and understanding of privacy and security of the video platform. The patient has agreed to participate and understands they can discontinue the visit at any time.    Patient is aware this is a billable service.     It was my intent to perform this visit via video technology but the patient was not able to do a video connection so the visit was completed via audio telephone only.      History of Present Illness     Declan Aguero is a 39 y.o. male who presents with crohn's.     Energy levels are better. He was put on prednisone. He was having several Bms, up to 10 bms per night and every 2 hours during the day. After the injection he notices some improvement. When the remicade was working there was a significant difference.     Currently on prednisone:  5 BMs per day, not fully formed and depends on the diet  No recent bright red blood per rectum/rectal bleeding, no melena  + urgency, + nocturnal BMs, + incontinence.  No abdominal pain  No rashes, no mouth sores, no joint pains  No heartburn, dysphagia, odynophagia, nausea, vomiting    MST:  Have you  recently lost weight?  No 0   Unsure 2    If yes, how much weight have you lost?  1-13lb  1  14-23lb 2  24-33lb 3  34lb or more  4    Weight loss score 0    Have you been eating poorly because of a decreased appetite?  No  0  Yes  1    Appetite score 0    MST 0 or 1 (not at risk)  MST 2 or more (at risk)      REVIEW OF SYSTEMS:  10 point ROS reviewed and negative, except as above    Pertinent Medical History     Medical History Reviewed by provider this encounter:       Past Medical History   Past Medical History:   Diagnosis Date    Colon polyp     Crohn's disease (HCC)     GERD (gastroesophageal reflux disease)     occasion    History of Crohn's disease     Lumbar herniated disc      Past Surgical History:   Procedure Laterality Date    BACK SURGERY      herniated disc surgery    COLONOSCOPY      EGD      WISDOM TOOTH EXTRACTION       Family History   Problem Relation Age of Onset    Heart attack Father     Diabetes Father     Neuropathy Father     Kidney disease Father     Cancer Maternal Uncle     Colon cancer Maternal Uncle      Current Outpatient Medications on File Prior to Visit   Medication Sig Dispense Refill    albuterol (Proventil HFA) 90 mcg/act inhaler Inhale 2 puffs every 6 (six) hours as needed for wheezing or shortness of breath 8 g 0    dicyclomine (BENTYL) 10 mg capsule Take 1 capsule (10 mg total) by mouth 3 (three) times a day as needed (abdominal pain and cramping) 90 capsule 0    ergocalciferol (VITAMIN D2) 50,000 units Take 1 capsule (50,000 Units total) by mouth once a week 12 capsule 0    predniSONE 10 mg tablet Take 4 tablets (40 mg total) by mouth daily for 7 days, THEN 3 tablets (30 mg total) daily for 7 days, THEN 2 tablets (20 mg total) daily for 7 days, THEN 1 tablet (10 mg total) daily for 7 days. 70 tablet 0    ustekinumab (Stelara) 90 mg/mL subcutaneous injection 90 mg subcutaneously every 56 days. 1 mL 5     No current facility-administered medications on file prior to visit.   No  Known Allergies   Current Outpatient Medications on File Prior to Visit   Medication Sig Dispense Refill    albuterol (Proventil HFA) 90 mcg/act inhaler Inhale 2 puffs every 6 (six) hours as needed for wheezing or shortness of breath 8 g 0    dicyclomine (BENTYL) 10 mg capsule Take 1 capsule (10 mg total) by mouth 3 (three) times a day as needed (abdominal pain and cramping) 90 capsule 0    ergocalciferol (VITAMIN D2) 50,000 units Take 1 capsule (50,000 Units total) by mouth once a week 12 capsule 0    predniSONE 10 mg tablet Take 4 tablets (40 mg total) by mouth daily for 7 days, THEN 3 tablets (30 mg total) daily for 7 days, THEN 2 tablets (20 mg total) daily for 7 days, THEN 1 tablet (10 mg total) daily for 7 days. 70 tablet 0    ustekinumab (Stelara) 90 mg/mL subcutaneous injection 90 mg subcutaneously every 56 days. 1 mL 5     No current facility-administered medications on file prior to visit.      Social History     Tobacco Use    Smoking status: Every Day     Current packs/day: 1.00     Average packs/day: 1 pack/day for 25.6 years (25.6 ttl pk-yrs)     Types: Cigarettes     Start date: 1999    Smokeless tobacco: Never   Vaping Use    Vaping status: Never Used   Substance and Sexual Activity    Alcohol use: Yes     Comment: occasional    Drug use: Not Currently    Sexual activity: Not on file     Objective     There were no vitals taken for this visit.    PHYSICAL EXAMINATION:  Unable to perform physical examination given the unavailability of a video application.           Visit Time  Total Visit Duration: 20

## 2024-07-29 NOTE — TELEPHONE ENCOUNTER
Hi,    Can we get him approved for stelara every 4 weeks?  (Note, his recent stelara level is not a true trough)  Thank you!

## 2024-08-08 DIAGNOSIS — K50.119 CROHN'S DISEASE OF COLON WITH COMPLICATION (HCC): ICD-10-CM

## 2024-08-08 RX ORDER — PREDNISONE 10 MG/1
TABLET ORAL
Qty: 70 TABLET | Refills: 0 | Status: SHIPPED | OUTPATIENT
Start: 2024-08-08 | End: 2024-09-04

## 2024-08-08 NOTE — TELEPHONE ENCOUNTER
Reason for call:   [x] Refill   [] Prior Auth  [x] Other: pt is starting with symptoms again and is worried about having a full flare up, hopes he can get another round of the prednisone since he's not sure when he'll be able to get the stelara again     Office:   [] PCP/Provider -   [x] Specialty/Provider - gastro    Medication: predinsone    Dose/Frequency: 10 mg tablet    Quantity:     Pharmacy: [facility] [address]    Does the patient have enough for 3 days?   [] Yes   [x] No - Send as HP to POD

## 2024-08-08 NOTE — TELEPHONE ENCOUNTER
Patient called the RX Refill Line. Message is being forwarded to the office.     Patient is requesting a call back with an update as to when he'll be able to get the stelara    Please contact patient at

## 2024-08-19 DIAGNOSIS — K50.119 CROHN'S DISEASE OF COLON WITH COMPLICATION (HCC): Primary | ICD-10-CM

## 2024-08-19 RX ORDER — USTEKINUMAB 90 MG/ML
90 INJECTION, SOLUTION SUBCUTANEOUS
Qty: 1 ML | Refills: 11 | Status: SHIPPED | OUTPATIENT
Start: 2024-08-19

## 2024-09-03 DIAGNOSIS — K50.119 CROHN'S DISEASE OF COLON WITH COMPLICATION (HCC): ICD-10-CM

## 2024-09-05 NOTE — TELEPHONE ENCOUNTER
I spoke with the patient. Patient approved for 4 weeks Stelara and will begin 4 week dosing in 3 week as last 8 week injection on 8/31.  Pt will have completed 4 weeks prednisone taper tomorrow. For the past month pt was feeling better, having 4 semi formed bowel movements. Since Tuesday patient has noticed an increase in bowel movements to 6-10. Pt requested additional 2 weeks of low dose prednisone. Denies abdominal pain.

## 2024-09-06 RX ORDER — PREDNISONE 10 MG/1
TABLET ORAL
Qty: 21 TABLET | Refills: 0 | Status: SHIPPED | OUTPATIENT
Start: 2024-09-06 | End: 2024-09-20

## 2024-09-06 NOTE — TELEPHONE ENCOUNTER
Called patient, reached voicemail, left detailed message medication requested yesterday has been sent to [facility] and to call back with any questions.

## 2024-12-04 ENCOUNTER — TELEPHONE (OUTPATIENT)
Dept: GASTROENTEROLOGY | Facility: CLINIC | Age: 39
End: 2024-12-04

## 2024-12-04 NOTE — TELEPHONE ENCOUNTER
Received form from [employer] patient assistance to be filled out and returned back.  Provider portion filled out.  Will send the patient portion to to the pt via [employer].

## 2024-12-05 ENCOUNTER — TELEPHONE (OUTPATIENT)
Age: 39
End: 2024-12-05

## 2024-12-05 NOTE — TELEPHONE ENCOUNTER
Patient contacted office to make aware he will be sending patient assistance forms for [employer] via [employer] If he has trouble he will be dropping off in office.

## 2024-12-25 ENCOUNTER — APPOINTMENT (EMERGENCY)
Dept: RADIOLOGY | Facility: HOSPITAL | Age: 39
End: 2024-12-25

## 2024-12-25 ENCOUNTER — APPOINTMENT (EMERGENCY)
Dept: CT IMAGING | Facility: HOSPITAL | Age: 39
End: 2024-12-25

## 2024-12-25 ENCOUNTER — HOSPITAL ENCOUNTER (EMERGENCY)
Facility: HOSPITAL | Age: 39
Discharge: HOME | End: 2024-12-26
Attending: EMERGENCY MEDICINE

## 2024-12-25 DIAGNOSIS — F15.929: ICD-10-CM

## 2024-12-25 DIAGNOSIS — R41.82 ALTERED MENTAL STATUS: Primary | ICD-10-CM

## 2024-12-25 DIAGNOSIS — F12.929 MARIJUANA INTOXICATION (HCC): ICD-10-CM

## 2024-12-25 LAB
ALBUMIN SERPL BCG-MCNC: 3.9 G/DL (ref 3.5–5)
ALP SERPL-CCNC: 114 U/L (ref 34–104)
ALT SERPL W P-5'-P-CCNC: 17 U/L (ref 7–52)
AMMONIA PLAS-SCNC: 38 UMOL/L (ref 18–72)
ANION GAP SERPL CALCULATED.3IONS-SCNC: 8 MMOL/L (ref 4–13)
APAP SERPL-MCNC: <2 UG/ML (ref 10–20)
AST SERPL W P-5'-P-CCNC: 17 U/L (ref 13–39)
ATRIAL RATE: 97 BPM
BASOPHILS # BLD AUTO: 0.05 THOUSANDS/ÂΜL (ref 0–0.1)
BASOPHILS NFR BLD AUTO: 1 % (ref 0–1)
BILIRUB SERPL-MCNC: 0.37 MG/DL (ref 0.2–1)
BUN SERPL-MCNC: 8 MG/DL (ref 5–25)
CALCIUM SERPL-MCNC: 8.6 MG/DL (ref 8.4–10.2)
CHLORIDE SERPL-SCNC: 107 MMOL/L (ref 96–108)
CO2 SERPL-SCNC: 23 MMOL/L (ref 21–32)
CREAT SERPL-MCNC: 0.69 MG/DL (ref 0.6–1.3)
EOSINOPHIL # BLD AUTO: 0.07 THOUSAND/ÂΜL (ref 0–0.61)
EOSINOPHIL NFR BLD AUTO: 1 % (ref 0–6)
ERYTHROCYTE [DISTWIDTH] IN BLOOD BY AUTOMATED COUNT: 13.2 % (ref 11.6–15.1)
ETHANOL SERPL-MCNC: <10 MG/DL
GFR SERPL CREATININE-BSD FRML MDRD: 117 ML/MIN/1.73SQ M
GLUCOSE SERPL-MCNC: 116 MG/DL (ref 65–140)
GLUCOSE SERPL-MCNC: 116 MG/DL (ref 65–140)
HCT VFR BLD AUTO: 43.4 % (ref 36.5–49.3)
HGB BLD-MCNC: 14.4 G/DL (ref 12–17)
IMM GRANULOCYTES # BLD AUTO: 0.05 THOUSAND/UL (ref 0–0.2)
IMM GRANULOCYTES NFR BLD AUTO: 1 % (ref 0–2)
LIPASE SERPL-CCNC: 7 U/L (ref 11–82)
LYMPHOCYTES # BLD AUTO: 2.25 THOUSANDS/ÂΜL (ref 0.6–4.47)
LYMPHOCYTES NFR BLD AUTO: 23 % (ref 14–44)
MAGNESIUM SERPL-MCNC: 1.9 MG/DL (ref 1.9–2.7)
MCH RBC QN AUTO: 30.2 PG (ref 26.8–34.3)
MCHC RBC AUTO-ENTMCNC: 33.2 G/DL (ref 31.4–37.4)
MCV RBC AUTO: 91 FL (ref 82–98)
MONOCYTES # BLD AUTO: 0.65 THOUSAND/ÂΜL (ref 0.17–1.22)
MONOCYTES NFR BLD AUTO: 7 % (ref 4–12)
NEUTROPHILS # BLD AUTO: 6.78 THOUSANDS/ÂΜL (ref 1.85–7.62)
NEUTS SEG NFR BLD AUTO: 67 % (ref 43–75)
NRBC BLD AUTO-RTO: 0 /100 WBCS
P AXIS: 58 DEGREES
PLATELET # BLD AUTO: 278 THOUSANDS/UL (ref 149–390)
PMV BLD AUTO: 8.7 FL (ref 8.9–12.7)
POTASSIUM SERPL-SCNC: 4.2 MMOL/L (ref 3.5–5.3)
PR INTERVAL: 138 MS
PROT SERPL-MCNC: 6.8 G/DL (ref 6.4–8.4)
QRS AXIS: 68 DEGREES
QRSD INTERVAL: 92 MS
QT INTERVAL: 360 MS
QTC INTERVAL: 457 MS
RBC # BLD AUTO: 4.77 MILLION/UL (ref 3.88–5.62)
SALICYLATES SERPL-MCNC: <5 MG/DL (ref 3–20)
SODIUM SERPL-SCNC: 138 MMOL/L (ref 135–147)
T WAVE AXIS: 49 DEGREES
VENTRICULAR RATE: 97 BPM
WBC # BLD AUTO: 9.85 THOUSAND/UL (ref 4.31–10.16)

## 2024-12-25 PROCEDURE — 70450 CT HEAD/BRAIN W/O DYE: CPT

## 2024-12-25 PROCEDURE — 99284 EMERGENCY DEPT VISIT MOD MDM: CPT

## 2024-12-25 PROCEDURE — 80143 DRUG ASSAY ACETAMINOPHEN: CPT

## 2024-12-25 PROCEDURE — 85025 COMPLETE CBC W/AUTO DIFF WBC: CPT

## 2024-12-25 PROCEDURE — 71045 X-RAY EXAM CHEST 1 VIEW: CPT

## 2024-12-25 PROCEDURE — 82140 ASSAY OF AMMONIA: CPT | Performed by: EMERGENCY MEDICINE

## 2024-12-25 PROCEDURE — 80053 COMPREHEN METABOLIC PANEL: CPT

## 2024-12-25 PROCEDURE — 96374 THER/PROPH/DIAG INJ IV PUSH: CPT

## 2024-12-25 PROCEDURE — 82077 ASSAY SPEC XCP UR&BREATH IA: CPT

## 2024-12-25 PROCEDURE — 84443 ASSAY THYROID STIM HORMONE: CPT | Performed by: EMERGENCY MEDICINE

## 2024-12-25 PROCEDURE — 83690 ASSAY OF LIPASE: CPT

## 2024-12-25 PROCEDURE — 82550 ASSAY OF CK (CPK): CPT | Performed by: EMERGENCY MEDICINE

## 2024-12-25 PROCEDURE — 96375 TX/PRO/DX INJ NEW DRUG ADDON: CPT

## 2024-12-25 PROCEDURE — 99285 EMERGENCY DEPT VISIT HI MDM: CPT | Performed by: EMERGENCY MEDICINE

## 2024-12-25 PROCEDURE — 36415 COLL VENOUS BLD VENIPUNCTURE: CPT

## 2024-12-25 PROCEDURE — 83735 ASSAY OF MAGNESIUM: CPT

## 2024-12-25 PROCEDURE — 93005 ELECTROCARDIOGRAM TRACING: CPT

## 2024-12-25 PROCEDURE — 82948 REAGENT STRIP/BLOOD GLUCOSE: CPT

## 2024-12-25 PROCEDURE — 80179 DRUG ASSAY SALICYLATE: CPT

## 2024-12-25 RX ORDER — LORAZEPAM 2 MG/ML
1 INJECTION INTRAMUSCULAR ONCE
Status: COMPLETED | OUTPATIENT
Start: 2024-12-25 | End: 2024-12-25

## 2024-12-25 RX ORDER — DIPHENHYDRAMINE HYDROCHLORIDE 50 MG/ML
25 INJECTION INTRAMUSCULAR; INTRAVENOUS ONCE
Status: COMPLETED | OUTPATIENT
Start: 2024-12-25 | End: 2024-12-25

## 2024-12-25 RX ORDER — DIAZEPAM 5 MG/ML
1 SYRINGE (ML) INJECTION ONCE
Status: COMPLETED | OUTPATIENT
Start: 2024-12-25 | End: 2024-12-25

## 2024-12-25 RX ORDER — DROPERIDOL 2.5 MG/ML
1.25 INJECTION, SOLUTION INTRAMUSCULAR; INTRAVENOUS ONCE
Status: DISCONTINUED | OUTPATIENT
Start: 2024-12-25 | End: 2024-12-25

## 2024-12-25 RX ADMIN — DIPHENHYDRAMINE HYDROCHLORIDE 25 MG: 50 INJECTION, SOLUTION INTRAMUSCULAR; INTRAVENOUS at 22:54

## 2024-12-25 RX ADMIN — LORAZEPAM 1 MG: 2 INJECTION INTRAMUSCULAR; INTRAVENOUS at 22:56

## 2024-12-26 VITALS
HEART RATE: 67 BPM | DIASTOLIC BLOOD PRESSURE: 92 MMHG | TEMPERATURE: 98 F | OXYGEN SATURATION: 98 % | RESPIRATION RATE: 18 BRPM | SYSTOLIC BLOOD PRESSURE: 154 MMHG

## 2024-12-26 LAB
AMPHETAMINES SERPL QL SCN: POSITIVE
BARBITURATES UR QL: NEGATIVE
BENZODIAZ UR QL: NEGATIVE
BILIRUB UR QL STRIP: NEGATIVE
CK SERPL-CCNC: 83 U/L (ref 39–308)
CLARITY UR: CLEAR
COCAINE UR QL: NEGATIVE
COLOR UR: NORMAL
FENTANYL UR QL SCN: NEGATIVE
GLUCOSE UR STRIP-MCNC: NEGATIVE MG/DL
HGB UR QL STRIP.AUTO: NEGATIVE
HYDROCODONE UR QL SCN: NEGATIVE
KETONES UR STRIP-MCNC: NEGATIVE MG/DL
LEUKOCYTE ESTERASE UR QL STRIP: NEGATIVE
METHADONE UR QL: NEGATIVE
NITRITE UR QL STRIP: NEGATIVE
OPIATES UR QL SCN: NEGATIVE
OXYCODONE+OXYMORPHONE UR QL SCN: NEGATIVE
PCP UR QL: NEGATIVE
PH UR STRIP.AUTO: 6.5 [PH]
PROT UR STRIP-MCNC: NEGATIVE MG/DL
SP GR UR STRIP.AUTO: 1.01 (ref 1–1.03)
THC UR QL: POSITIVE
TSH SERPL DL<=0.05 MIU/L-ACNC: 0.84 UIU/ML (ref 0.45–4.5)
UROBILINOGEN UR STRIP-ACNC: <2 MG/DL

## 2024-12-26 PROCEDURE — 80307 DRUG TEST PRSMV CHEM ANLYZR: CPT

## 2024-12-26 PROCEDURE — 81003 URINALYSIS AUTO W/O SCOPE: CPT

## 2024-12-26 NOTE — ED ATTENDING ATTESTATION
12/25/2024  I, Duke Garrett MD, saw and evaluated the patient. I have discussed the patient with the resident/non-physician practitioner and agree with the resident's/non-physician practitioner's findings, Plan of Care, and MDM as documented in the resident's/non-physician practitioner's note, except where noted. All available labs and Radiology studies were reviewed.  I was present for key portions of any procedure(s) performed by the resident/non-physician practitioner and I was immediately available to provide assistance.       At this point I agree with the current assessment done in the Emergency Department.  I have conducted an independent evaluation of this patient a history and physical is as follows: Patient is a 39 year old male with altered mental status and abnormal movements tonight about 1 hour ago as per girlfriend. Patient reportedly ate an edible THC tonight which he has done in the past. Patient unable to provide hx and needed our urgent attention. FS was 89 in the field. Was given valium in the field. Was last seen at [facility] on 7/29/24 for crohns. [facility] website checked on this patient and no Rx found. NCAT. PERRL. Mucous membranes moist. Lungs clear. Heart regular without murmur. Abdomen soft and nontender. Good bowel sounds. No edema. No rash noted. (+) abnormal movements and not seizure like. Not answering questions and moaning. DDx including but not limited to: metabolic abnormality, intracranial etiology, cardiac etiology, hypercarbia, hypoxia, substance abuse, doubt infectious etiology; seizure, dystonia, thyroid disease, hyperammonemia, delirium, dementia, overmedication. Will check labs, EKG, CXR and CT head and give IV ativan.     ED Course         Critical Care Time  Procedures

## 2024-12-26 NOTE — ED PROVIDER NOTES
Time reflects when diagnosis was documented in both MDM as applicable and the Disposition within this note       Time User Action Codes Description Comment    12/26/2024  1:55 AM Vasyl Eddy [R41.82] Altered mental status     12/26/2024  1:56 AM Vasyl Eddy [F15.929] Amphetamine intoxication (HCC)     12/26/2024  1:56 AM Vasyl Eddy [F12.929] Marijuana intoxication (HCC)           ED Disposition       ED Disposition   Discharge    Condition   Stable    Date/Time   u Dec 26, 2024  1:56 AM    Comment   Declan Aguero discharge to home/self care.                   Assessment & Plan       Medical Decision Making  Patient seen and examined.  He is altered with eyes closed, he is moving all extremities with coarse nonrhythmic movements but unable to follow commands, he is making incomprehensible noises.  GCS 11.  Abdomen is intermittently rigid when the patient have sits up.  DDx including but not limited to: Marijuana intoxication, methamphetamine intoxication, cocaine intoxication metabolic abnormality, intracranial etiology, cardiac etiology, hypercarbia, hypoxia, substance abuse, infectious etiology including UTI, thyroid disease, hyperammonemia, delirium, dementia, overmedication.     Chest x-ray within normal limits.  Head CT shows no acute intracranial abnormality.    Patient reevaluated.  He is now following commands and able to answer simple questions.  When asked if anything hurts he says no.  When asked if anything is bothering him he says no.  Coarse nonrhythmic movements have stopped.  GCS now 14, he is still confused but otherwise has returned to baseline.    Patient reevaluated again, he is now back to baseline, GCS 15.  He is still intoxicated however girlfriend has excepted responsibility for him and will take care of him.  Patient is agreeable to discharge into his girlfriend's care and follow-up with primary care doctor.  All questions answered and strict return precautions  discussed.    Amount and/or Complexity of Data Reviewed  Labs: ordered.  Radiology: ordered and independent interpretation performed.    Risk  Prescription drug management.             Medications   diazepam (FOR EMS ONLY)(VALIUM) 5 mg/mL injection 1 each (0 mg Does not apply Given to EMS 12/25/24 2215)   LORazepam (ATIVAN) injection 1 mg (1 mg Intravenous Given 12/25/24 2256)   diphenhydrAMINE (BENADRYL) injection 25 mg (25 mg Intravenous Given 12/25/24 2254)       ED Risk Strat Scores                                              History of Present Illness       Chief Complaint   Patient presents with    Altered Mental Status     Per EMS pt ate an edible earlier today and began experiencing uncontrolled movements. Has eaten these edibles before with no reaction.        Past Medical History:   Diagnosis Date    Colon polyp     Crohn's disease (HCC)     GERD (gastroesophageal reflux disease)     occasion    History of Crohn's disease     Lumbar herniated disc       Past Surgical History:   Procedure Laterality Date    BACK SURGERY      herniated disc surgery    COLONOSCOPY      EGD      WISDOM TOOTH EXTRACTION        Family History   Problem Relation Age of Onset    Heart attack Father     Diabetes Father     Neuropathy Father     Kidney disease Father     Cancer Maternal Uncle     Colon cancer Maternal Uncle       Social History     Tobacco Use    Smoking status: Every Day     Current packs/day: 1.00     Average packs/day: 1 pack/day for 26.0 years (26.0 ttl pk-yrs)     Types: Cigarettes     Start date: 1999    Smokeless tobacco: Never   Vaping Use    Vaping status: Never Used   Substance Use Topics    Alcohol use: Yes     Comment: occasional    Drug use: Not Currently      E-Cigarette/Vaping    E-Cigarette Use Never User       E-Cigarette/Vaping Substances    Nicotine No     THC No     CBD No     Flavoring No     Other No     Unknown No       I have reviewed and agree with the history as documented.     39-year-old  man presenting with altered mental status.  By EMS.  Per girlfriend he ate an edible today and afterwards he was acting high and like he had a dry mouth.  She describes this as him making movements of his tongue and jaw that appeared like he was thirsty and had dry mouth.  She then found him making incomprehensible noises and moving in a strange way and called EMS.  EMS gave Valium.      History provided by:  Patient  Altered Mental Status  Associated symptoms: no abdominal pain, no fever, no headaches, no light-headedness, no nausea, no palpitations, no rash, no seizures, no vomiting and no weakness        Review of Systems   Reason unable to perform ROS: Altered mental status, not following commands or answering questions.   Constitutional:  Positive for fatigue. Negative for chills and fever.   HENT:  Negative for congestion, ear pain, postnasal drip, rhinorrhea, sinus pain and sore throat.    Eyes:  Negative for pain and visual disturbance.   Respiratory:  Negative for cough, chest tightness and shortness of breath.    Cardiovascular:  Negative for chest pain and palpitations.   Gastrointestinal:  Negative for abdominal pain, constipation, diarrhea, nausea and vomiting.   Genitourinary:  Negative for difficulty urinating, dysuria and hematuria.   Musculoskeletal:  Negative for back pain.   Skin:  Negative for color change and rash.   Neurological:  Negative for dizziness, seizures, syncope, weakness, light-headedness, numbness and headaches.   All other systems reviewed and are negative.          Objective       ED Triage Vitals   Temperature Pulse Blood Pressure Respirations SpO2 Patient Position - Orthostatic VS   12/25/24 2301 12/25/24 2211 12/25/24 2211 12/25/24 2211 12/25/24 2211 12/25/24 2211   98 °F (36.7 °C) 96 155/97 22 98 % Sitting      Temp Source Heart Rate Source BP Location FiO2 (%) Pain Score    12/25/24 2301 12/25/24 2211 12/25/24 2211 -- --    Oral Monitor Left arm        Vitals      Date and  Time Temp Pulse SpO2 Resp BP Pain Score FACES Pain Rating User   12/26/24 0000 -- 67 98 % 18 154/92 -- -- JY   12/25/24 2330 -- 69 97 % 20 141/88 -- -- JY   12/25/24 2301 98 °F (36.7 °C) -- -- -- -- -- -- JY   12/25/24 2300 -- 69 97 % 20 146/91 -- --    12/25/24 2230 -- 79 97 % 22 169/77 -- --    12/25/24 2211 -- 96 98 % 22 155/97 -- -- JY            Physical Exam  Constitutional:       General: He is in acute distress.      Appearance: He is toxic-appearing. He is not diaphoretic.   HENT:      Head: Normocephalic and atraumatic.      Nose: No congestion or rhinorrhea.      Mouth/Throat:      Mouth: Mucous membranes are moist.      Pharynx: No oropharyngeal exudate.   Eyes:      General: No scleral icterus.     Pupils: Pupils are equal, round, and reactive to light.   Cardiovascular:      Rate and Rhythm: Normal rate and regular rhythm.      Heart sounds: Normal heart sounds. No murmur heard.     No friction rub. No gallop.   Pulmonary:      Effort: No respiratory distress.      Breath sounds: Normal breath sounds. No wheezing, rhonchi or rales.   Abdominal:      General: Abdomen is flat. There is no distension.      Palpations: Abdomen is soft.      Tenderness: There is no abdominal tenderness.   Musculoskeletal:      Cervical back: Neck supple.   Lymphadenopathy:      Cervical: No cervical adenopathy.   Skin:     General: Skin is warm and dry.      Capillary Refill: Capillary refill takes less than 2 seconds.   Neurological:      General: No focal deficit present.      Mental Status: He is disoriented and confused.      GCS: GCS eye subscore is 3. GCS verbal subscore is 3. GCS motor subscore is 5.      Motor: Abnormal muscle tone present. No weakness.         Results Reviewed       Procedure Component Value Units Date/Time    Rapid drug screen, urine [[id number]]  (Abnormal) Collected: 12/26/24 0030    Lab Status: Final result Specimen: Urine, Clean Catch Updated: 12/26/24 0139     Amph/Meth UR Positive      Barbiturate Ur Negative     Benzodiazepine Urine Negative     Cocaine Urine Negative     Methadone Urine Negative     Opiate Urine Negative     PCP Ur Negative     THC Urine Positive     Oxycodone Urine Negative     Fentanyl Urine Negative     HYDROCODONE URINE Negative    Narrative:      Presumptive report. If requested, specimen will be sent to reference lab for confirmation.  FOR MEDICAL PURPOSES ONLY.   IF CONFIRMATION NEEDED PLEASE CONTACT THE LAB WITHIN 5 DAYS.    Drug Screen Cutoff Levels:  AMPHETAMINE/METHAMPHETAMINES  1000 ng/mL  BARBITURATES     200 ng/mL  BENZODIAZEPINES     200 ng/mL  COCAINE      300 ng/mL  METHADONE      300 ng/mL  OPIATES      300 ng/mL  PHENCYCLIDINE     25 ng/mL  THC       50 ng/mL  OXYCODONE      100 ng/mL  FENTANYL      5 ng/mL  HYDROCODONE     300 ng/mL    UA w Reflex to Microscopic w Reflex to Culture [[id number]] Collected: 12/26/24 0030    Lab Status: Final result Specimen: Urine, Clean Catch Updated: 12/26/24 0057     Color, UA Light Yellow     Clarity, UA Clear     Specific Gravity, UA 1.007     pH, UA 6.5     Leukocytes, UA Negative     Nitrite, UA Negative     Protein, UA Negative mg/dl      Glucose, UA Negative mg/dl      Ketones, UA Negative mg/dl      Urobilinogen, UA <2.0 mg/dl      Bilirubin, UA Negative     Occult Blood, UA Negative    CK [[id number]]  (Normal) Collected: 12/25/24 2229    Lab Status: Final result Specimen: Blood from Arm, Right Updated: 12/26/24 0032     Total CK 83 U/L     TSH, 3rd generation with Free T4 reflex [[id number]]  (Normal) Collected: 12/25/24 2229    Lab Status: Final result Specimen: Blood from Arm, Right Updated: 12/26/24 0032     TSH 3RD GENERATON 0.839 uIU/mL     Comprehensive metabolic panel [[id number]]  (Abnormal) Collected: 12/25/24 2229    Lab Status: Final result Specimen: Blood from Arm, Right Updated: 12/25/24 2329     Sodium 138 mmol/L      Potassium 4.2 mmol/L      Chloride 107 mmol/L      CO2 23 mmol/L      ANION GAP 8 mmol/L       BUN 8 mg/dL      Creatinine 0.69 mg/dL      Glucose 116 mg/dL      Calcium 8.6 mg/dL      AST 17 U/L      ALT 17 U/L      Alkaline Phosphatase 114 U/L      Total Protein 6.8 g/dL      Albumin 3.9 g/dL      Total Bilirubin 0.37 mg/dL      eGFR 117 ml/min/1.73sq m     Narrative:      National Kid[employer] Foundation guidelines for Chronic Kidney Disease (CKD):     Stage 1 with normal or high GFR (GFR > 90 mL/min/1.73 square meters)    Stage 2 Mild CKD (GFR = 60-89 mL/min/1.73 square meters)    Stage 3A Moderate CKD (GFR = 45-59 mL/min/1.73 square meters)    Stage 3B Moderate CKD (GFR = 30-44 mL/min/1.73 square meters)    Stage 4 Severe CKD (GFR = 15-29 mL/min/1.73 square meters)    Stage 5 End Stage CKD (GFR <15 mL/min/1.73 square meters)  Note: GFR calculation is accurate only with a steady state creatinine    Magnesium [[id number]]  (Normal) Collected: 12/25/24 2229    Lab Status: Final result Specimen: Blood from Arm, Right Updated: 12/25/24 2329     Magnesium 1.9 mg/dL     Lipase [[id number]]  (Abnormal) Collected: 12/25/24 2229    Lab Status: Final result Specimen: Blood from Arm, Right Updated: 12/25/24 2329     Lipase 7 u/L     Salicylate level [[id number]]  (Normal) Collected: 12/25/24 2229    Lab Status: Final result Specimen: Blood from Arm, Right Updated: 12/25/24 2329     Salicylate Lvl <5 mg/dL     Acetaminophen level-If concentration is detectable, please discuss with medical  on call. [[id number]]  (Abnormal) Collected: 12/25/24 2229    Lab Status: Final result Specimen: Blood from Arm, Right Updated: 12/25/24 2329     Acetaminophen Level <2 ug/mL     Ammonia [[id number]]  (Normal) Collected: 12/25/24 2259    Lab Status: Final result Specimen: Blood from Arm, Right Updated: 12/25/24 2327     Ammonia 38 umol/L     Ethanol [[id number]]  (Normal) Collected: 12/25/24 2229    Lab Status: Final result Specimen: Blood from Arm, Right Updated: 12/25/24 9656     Ethanol Lvl <10 mg/dL     CBC and  differential [[id number]]  (Abnormal) Collected: 12/25/24 2229    Lab Status: Final result Specimen: Blood from Arm, Right Updated: 12/25/24 2256     WBC 9.85 Thousand/uL      RBC 4.77 Million/uL      Hemoglobin 14.4 g/dL      Hematocrit 43.4 %      MCV 91 fL      MCH 30.2 pg      MCHC 33.2 g/dL      RDW 13.2 %      MPV 8.7 fL      Platelets 278 Thousands/uL      nRBC 0 /100 WBCs      Segmented % 67 %      Immature Grans % 1 %      Lymphocytes % 23 %      Monocytes % 7 %      Eosinophils Relative 1 %      Basophils Relative 1 %      Absolute Neutrophils 6.78 Thousands/µL      Absolute Immature Grans 0.05 Thousand/uL      Absolute Lymphocytes 2.25 Thousands/µL      Absolute Monocytes 0.65 Thousand/µL      Eosinophils Absolute 0.07 Thousand/µL      Basophils Absolute 0.05 Thousands/µL     Fingerstick Glucose (POCT) [[id number]]  (Normal) Collected: 12/25/24 2251    Lab Status: Final result Specimen: Blood Updated: 12/25/24 2252     POC Glucose 116 mg/dl             XR chest 1 view portable   ED Interpretation by Vasyl Eddy MD (12/25 2328)   No acute cardiopulmonary disease. Independently interpreted by myself.      CT head without contrast   Final Interpretation by Missael Peralta DO (12/25 2339)      No acute intracranial abnormality is seen.                  Workstation performed: [id number]             ECG 12 Lead Documentation Only    Date/Time: 12/26/2024 12:35 AM    Performed by: Vasyl Eddy MD  Authorized by: Vasyl Eddy MD    Indications / Diagnosis:  AMS  ECG reviewed by me, the ED Provider: yes    Patient location:  ED  Previous ECG:     Previous ECG:  Unavailable    Comparison to cardiac monitor: Yes    Interpretation:     Interpretation: abnormal    Quality:     Tracing quality:  Limited by artifact (Patient unable to stay still at time of EKG)  Rate:     ECG rate:  97    ECG rate assessment: normal    Rhythm:     Rhythm: sinus rhythm    Ectopy:     Ectopy: none    QRS:     QRS axis:  Normal     QRS intervals:  Normal  Conduction:     Conduction: normal    ST segments:     ST segments:  Normal  T waves:     T waves: normal        ED Medication and Procedure Management   Prior to Admission Medications   Prescriptions Last Dose Informant Patient Reported? Taking?   Stelara 90 MG/ML subcutaneous injection   No No   Sig: Inject 1 mL (90 mg total) under the skin every 28 days   albuterol (Proventil HFA) 90 mcg/act inhaler  Self No No   Sig: Inhale 2 puffs every 6 (six) hours as needed for wheezing or shortness of breath   dicyclomine (BENTYL) 10 mg capsule  Self No No   Sig: Take 1 capsule (10 mg total) by mouth 3 (three) times a day as needed (abdominal pain and cramping)   ergocalciferol (VITAMIN D2) 50,000 units  Self No No   Sig: Take 1 capsule (50,000 Units total) by mouth once a week   ustekinumab (Stelara) 90 mg/mL subcutaneous injection  Self No No   Si mg subcutaneously every 56 days.      Facility-Administered Medications: None     Discharge Medication List as of 2024  1:56 AM        CONTINUE these medications which have NOT CHANGED    Details   albuterol (Proventil HFA) 90 mcg/act inhaler Inhale 2 puffs every 6 (six) hours as needed for wheezing or shortness of breath, Starting Mon 6/3/2024, Normal      dicyclomine (BENTYL) 10 mg capsule Take 1 capsule (10 mg total) by mouth 3 (three) times a day as needed (abdominal pain and cramping), Starting Fri 2023, Normal      ergocalciferol (VITAMIN D2) 50,000 units Take 1 capsule (50,000 Units total) by mouth once a week, Starting Tu2024, Normal      !! Stelara 90 MG/ML subcutaneous injection Inject 1 mL (90 mg total) under the skin every 28 days, Starting Mon 2024, Normal      !! ustekinumab (Stelara) 90 mg/mL subcutaneous injection 90 mg subcutaneously every 56 days., Normal       !! - Potential duplicate medications found. Please discuss with provider.        No discharge procedures on file.  ED SEPSIS DOCUMENTATION   Time  reflects when diagnosis was documented in both MDM as applicable and the Disposition within this note       Time User Action Codes Description Comment    12/26/2024  1:55 AM Vasyl Eddy [R41.82] Altered mental status     12/26/2024  1:56 AM Vasyl Eddy [F15.929] Amphetamine intoxication (HCC)     12/26/2024  1:56 AM Vasyl Eddy [F12.929] Marijuana intoxication (HCC)                  Vasyl Eddy MD  12/26/24 0513

## 2024-12-30 LAB
ATRIAL RATE: 97 BPM
P AXIS: 58 DEGREES
PR INTERVAL: 138 MS
QRS AXIS: 68 DEGREES
QRSD INTERVAL: 92 MS
QT INTERVAL: 360 MS
QTC INTERVAL: 457 MS
T WAVE AXIS: 49 DEGREES
VENTRICULAR RATE: 97 BPM

## 2024-12-30 PROCEDURE — 93010 ELECTROCARDIOGRAM REPORT: CPT | Performed by: INTERNAL MEDICINE

## 2025-02-11 ENCOUNTER — TELEPHONE (OUTPATIENT)
Dept: GASTROENTEROLOGY | Facility: CLINIC | Age: 40
End: 2025-02-11

## 2025-02-11 DIAGNOSIS — K50.119 CROHN'S DISEASE OF COLON WITH COMPLICATION (HCC): Primary | ICD-10-CM

## 2025-02-17 DIAGNOSIS — K50.119 CROHN'S DISEASE OF COLON WITH COMPLICATION (HCC): ICD-10-CM

## 2025-02-17 DIAGNOSIS — K50.819 CROHN'S DISEASE OF SMALL AND LARGE INTESTINES WITH COMPLICATION (HCC): ICD-10-CM

## 2025-02-18 RX ORDER — USTEKINUMAB 90 MG/ML
INJECTION, SOLUTION SUBCUTANEOUS
Qty: 1 ML | Refills: 5 | Status: SHIPPED | OUTPATIENT
Start: 2025-02-18

## 2025-02-18 RX ORDER — USTEKINUMAB 90 MG/ML
90 INJECTION, SOLUTION SUBCUTANEOUS
Qty: 1 ML | Refills: 0 | OUTPATIENT
Start: 2025-02-18

## 2025-02-20 RX ORDER — PREDNISONE 10 MG/1
TABLET ORAL
Qty: 70 TABLET | Refills: 0 | Status: SHIPPED | OUTPATIENT
Start: 2025-02-20 | End: 2025-03-20

## 2025-02-20 NOTE — TELEPHONE ENCOUNTER
I called and spoke with the patient.  Patient states he is 1 week overdue for Stelara.  Stelara every 4 weeks.  Patient states normal bowel pattern 5 times a day. For the past few days bowel movements 10-15 times per day and now seeing quarter sized blood on toilet paper with 1-2 stools daily.  + Generalized abdominal pain.  Patient requesting prednisone prescription to be sent to the [facility] in [facility] as a bridge until he can receive his Stelara. Pt aware to fill our forms asap and can take a picture of the complete forms and upload to [employer] message.

## 2025-02-20 NOTE — TELEPHONE ENCOUNTER
Pt called and said that he did not see the form in his My Chart, but will download it and print it so he can fill it out. He is asking if he can get an RX for Prednisone in the meantime. Please reach out to pt with any advice.

## 2025-03-31 DIAGNOSIS — K50.119 CROHN'S DISEASE OF COLON WITH COMPLICATION (HCC): Primary | ICD-10-CM

## 2025-03-31 DIAGNOSIS — K50.119 CROHN'S DISEASE OF COLON WITH COMPLICATION (HCC): ICD-10-CM

## 2025-03-31 NOTE — TELEPHONE ENCOUNTER
Patient requesting information on his Stelara, has not heard anything to update him status.    Patient previously ordered taper end of February and requesting another prednisone taper in meantime. Currently not having issues and is trying to avoid them occurring.

## 2025-04-01 NOTE — TELEPHONE ENCOUNTER
Last OV 7/29 Dr. Limon  Last OV 7/9 MORE Caballero. Crohn's disease    I called and spoke with patient.  Patient's last Stelara injection mid February.  Patient reports taking Stelara every 4 weeks is currently overdue- waiting on Stelara patient assistance- authorization team aware.  Patient completed prednisone taper on 3/30.  Patient reports 3-4 bowel movements a.m., 2-3 after lunch, 2 PM. Denies blood. Denies mucus.  Patient states 20 mg prednisone daily was the lowest dose on taper which helped decrease bowel frequency to 2 bowel movements in the a.m. 1 bowel movement at lunch and 1 in the evening, and when decrease to 10 mg, frequency increased.

## 2025-04-02 RX ORDER — PREDNISONE 20 MG/1
20 TABLET ORAL DAILY
Qty: 30 TABLET | Refills: 1 | Status: SHIPPED | OUTPATIENT
Start: 2025-04-02

## 2025-04-02 NOTE — TELEPHONE ENCOUNTER
I called and spoke with the patient, pt made aware prednisone 20 mg prescription has been sent to [facility] in [place] and pt to await notification from our authorization team regarding Stelara assistance program.

## 2025-04-03 NOTE — TELEPHONE ENCOUNTER
LVM with pt and have explained my reason for calling regarding medication. I have provided phone number, name and department for pt to return our call and confirmed medication.

## 2025-04-08 RX ORDER — PREDNISONE 10 MG/1
TABLET ORAL
Qty: 70 TABLET | Refills: 0 | OUTPATIENT
Start: 2025-04-08

## 2025-04-08 NOTE — TELEPHONE ENCOUNTER
Pt hasn't returned my call regarding refill for medication prednisone taper, I have noticed in pt's chart that recently it has been prescribed by another provider due to pt having GI symptoms and our IBD team has reached out to pt.

## 2025-04-08 NOTE — TELEPHONE ENCOUNTER
Steven Short RN       4/1/25 10:39 AM  Note      Last OV 7/29 Dr. Limon  Last OV 7/9 MORE Caballero. Crohn's disease     I called and spoke with patient.  Patient's last Stelara injection mid February.  Patient reports taking Stelara every 4 weeks is currently overdue- waiting on Stelara patient assistance- authorization team aware.  Patient completed prednisone taper on 3/30.  Patient reports 3-4 bowel movements a.m., 2-3 after lunch, 2 PM. Denies blood. Denies mucus.  Patient states 20 mg prednisone daily was the lowest dose on taper which helped decrease bowel frequency to 2 bowel movements in the a.m. 1 bowel movement at lunch and 1 in the evening, and when decrease to 10 mg, frequency increased.              Steven Short RN       4/1/25 10:40 AM  Note  Addended by: STEVEN SHORT on: 4/1/2025 10:40 AM       Modules accepted: Orders          Steven Short RN to Makenzie Gentile PA-C         4/1/25 10:40 AM  Sergio Banegas, Are you agreeable for pt to continue 20 mg prednisone until our authorization team hears back from patient assistance?      4/2/25  9:06 AM  Makenzie Gentile PA-C routed this conversation to TONE Erazo PA-C to Steven Short RN       4/2/25  9:06 AM  Yes that is fine to continue the prednisone 20mg for now      4/2/25  9:33 AM  Steven Short RN contacted Declan Agureo RN       4/2/25  9:34 AM  Note      I called and spoke with the patient, pt made aware prednisone 20 mg prescription has been sent to [facility] in [facility] and pt to await notification from our authorization team regarding Stelara assistance program.

## 2025-05-09 DIAGNOSIS — K50.119 CROHN'S DISEASE OF COLON WITH COMPLICATION (HCC): ICD-10-CM

## 2025-05-09 RX ORDER — PREDNISONE 20 MG/1
20 TABLET ORAL DAILY
Qty: 30 TABLET | Refills: 0 | Status: SHIPPED | OUTPATIENT
Start: 2025-05-09

## 2025-05-09 NOTE — TELEPHONE ENCOUNTER
Spoke with pt and have made him aware that script was approved and sent to pharmacy for . Pt was thankful for the call.

## 2025-06-23 ENCOUNTER — OFFICE VISIT (OUTPATIENT)
Dept: FAMILY MEDICINE CLINIC | Facility: CLINIC | Age: 40
End: 2025-06-23
Payer: COMMERCIAL

## 2025-06-23 VITALS
OXYGEN SATURATION: 98 % | BODY MASS INDEX: 28.29 KG/M2 | RESPIRATION RATE: 18 BRPM | HEART RATE: 73 BPM | WEIGHT: 191 LBS | SYSTOLIC BLOOD PRESSURE: 138 MMHG | HEIGHT: 69 IN | TEMPERATURE: 97.9 F | DIASTOLIC BLOOD PRESSURE: 88 MMHG

## 2025-06-23 DIAGNOSIS — K50.119 CROHN'S DISEASE OF LARGE INTESTINE WITH COMPLICATION (HCC): ICD-10-CM

## 2025-06-23 DIAGNOSIS — I10 ESSENTIAL HYPERTENSION: ICD-10-CM

## 2025-06-23 DIAGNOSIS — M25.50 POLYARTHRALGIA: ICD-10-CM

## 2025-06-23 DIAGNOSIS — Z00.00 ANNUAL PHYSICAL EXAM: Primary | ICD-10-CM

## 2025-06-23 PROCEDURE — 99396 PREV VISIT EST AGE 40-64: CPT | Performed by: FAMILY MEDICINE

## 2025-06-23 NOTE — PROGRESS NOTES
Adult Annual Physical  Name: Declan Aguero      : 1985      MRN: [id number]  Encounter Provider: Edgar Che DO  Encounter Date: 2025   Encounter department: [facility]    :  Assessment & Plan  Annual physical exam    Orders:    Comprehensive metabolic panel; Future    Lipid panel; Future    PSA, total and free; Future    TSH, 3rd generation with Free T4 reflex; Future    C-reactive protein; Future    CBC and differential; Future    Crohn's disease of large intestine with complication (HCC)  Patient followed by gastroenterology closely continue same medication regimen         Essential hypertension  Blood pressure stable borderline will monitor laboratory work diet and reevaluate yearly         Polyarthralgia  Discussed multiple joint aches and pains that come and go we will check C-reactive protein             Preventive Screenings:    - Prostate cancer screening: screening not indicated     Immunizations:  - Immunizations due: Prevnar 20, Tdap and Zoster (Shingrix)         History of Present Illness     Adult Annual Physical:  Patient presents for annual physical.     Diet and Physical Activity:  - Diet/Nutrition: no special diet.  - Exercise: no formal exercise.    Depression Screening:  - PHQ-2 Score: 0    General Health:  - Sleep: sleeps well.  - Hearing: normal hearing bilateral ears.  - Vision: no vision problems.  - Dental: regular dental visits.     Health:    - Urinary symptoms: none.     Review of Systems   Constitutional:  Negative for chills, fatigue and fever.   HENT:  Negative for congestion, nosebleeds, rhinorrhea, sinus pressure and sore throat.    Eyes:  Negative for discharge and redness.   Respiratory:  Negative for cough and shortness of breath.    Cardiovascular:  Negative for chest pain, palpitations and leg swelling.   Gastrointestinal:  Negative for abdominal pain, blood in stool and nausea.   Endocrine: Negative for cold intolerance, heat  "intolerance and polyuria.   Genitourinary:  Negative for dysuria and frequency.   Musculoskeletal:  Positive for arthralgias. Negative for back pain and myalgias.   Skin:  Negative for rash.   Neurological:  Negative for dizziness, weakness and headaches.   Hematological:  Negative for adenopathy.   Psychiatric/Behavioral:  Negative for behavioral problems and sleep disturbance. The patient is not nervous/anxious.          Objective   /88 (BP Location: Left arm, Patient Position: Sitting, Cuff Size: Standard)   Pulse 73   Temp 97.9 °F (36.6 °C) (Tympanic)   Resp 18   Ht 5' 9.29\" (1.76 m)   Wt 86.6 kg (191 lb)   SpO2 98%   BMI 27.97 kg/m²     Physical Exam  Vitals and nursing note reviewed.   Constitutional:       Appearance: Normal appearance. He is well-developed.   HENT:      Head: Normocephalic and atraumatic.      Right Ear: Tympanic membrane and external ear normal.      Left Ear: Tympanic membrane and external ear normal.      Nose: Nose normal.      Mouth/Throat:      Mouth: Mucous membranes are moist.     Eyes:      General: No scleral icterus.     Conjunctiva/sclera: Conjunctivae normal.      Pupils: Pupils are equal, round, and reactive to light.     Neck:      Thyroid: No thyromegaly.      Vascular: No JVD.     Cardiovascular:      Rate and Rhythm: Normal rate and regular rhythm.      Pulses: Normal pulses.      Heart sounds: Normal heart sounds. No murmur heard.  Pulmonary:      Effort: Pulmonary effort is normal.      Breath sounds: Normal breath sounds. No wheezing or rales.   Chest:      Chest wall: No tenderness.   Abdominal:      General: Bowel sounds are normal. There is no distension.      Palpations: Abdomen is soft. There is no mass.      Tenderness: There is no abdominal tenderness. There is no guarding or rebound.     Musculoskeletal:         General: No tenderness or deformity. Normal range of motion.      Cervical back: Normal range of motion and neck supple.   Lymphadenopathy: "      Cervical: No cervical adenopathy.     Skin:     General: Skin is warm and dry.      Findings: No erythema or rash.     Neurological:      General: No focal deficit present.      Mental Status: He is alert and oriented to person, place, and time.      Cranial Nerves: No cranial nerve deficit.      Deep Tendon Reflexes: Reflexes are normal and symmetric. Reflexes normal.     Psychiatric:         Mood and Affect: Mood normal.         Behavior: Behavior normal.         Thought Content: Thought content normal.         Judgment: Judgment normal.

## 2025-06-23 NOTE — ASSESSMENT & PLAN NOTE
Orders:    Comprehensive metabolic panel; Future    Lipid panel; Future    PSA, total and free; Future    TSH, 3rd generation with Free T4 reflex; Future    C-reactive protein; Future    CBC and differential; Future

## 2025-06-27 ENCOUNTER — APPOINTMENT (OUTPATIENT)
Dept: LAB | Facility: MEDICAL CENTER | Age: 40
End: 2025-06-27
Attending: FAMILY MEDICINE
Payer: COMMERCIAL

## 2025-06-27 DIAGNOSIS — Z00.00 ANNUAL PHYSICAL EXAM: ICD-10-CM

## 2025-06-27 LAB
ALBUMIN SERPL BCG-MCNC: 4.1 G/DL (ref 3.5–5)
ALP SERPL-CCNC: 109 U/L (ref 34–104)
ALT SERPL W P-5'-P-CCNC: 14 U/L (ref 7–52)
ANION GAP SERPL CALCULATED.3IONS-SCNC: 10 MMOL/L (ref 4–13)
AST SERPL W P-5'-P-CCNC: 13 U/L (ref 13–39)
BASOPHILS # BLD AUTO: 0.06 THOUSANDS/ÂΜL (ref 0–0.1)
BASOPHILS NFR BLD AUTO: 1 % (ref 0–1)
BILIRUB SERPL-MCNC: 0.38 MG/DL (ref 0.2–1)
BUN SERPL-MCNC: 10 MG/DL (ref 5–25)
CALCIUM SERPL-MCNC: 9.1 MG/DL (ref 8.4–10.2)
CHLORIDE SERPL-SCNC: 103 MMOL/L (ref 96–108)
CHOLEST SERPL-MCNC: 144 MG/DL (ref ?–200)
CO2 SERPL-SCNC: 25 MMOL/L (ref 21–32)
CREAT SERPL-MCNC: 0.77 MG/DL (ref 0.6–1.3)
CRP SERPL QL: 18.1 MG/L
EOSINOPHIL # BLD AUTO: 0.22 THOUSAND/ÂΜL (ref 0–0.61)
EOSINOPHIL NFR BLD AUTO: 2 % (ref 0–6)
ERYTHROCYTE [DISTWIDTH] IN BLOOD BY AUTOMATED COUNT: 12.3 % (ref 11.6–15.1)
GFR SERPL CREATININE-BSD FRML MDRD: 111 ML/MIN/1.73SQ M
GLUCOSE SERPL-MCNC: 81 MG/DL (ref 65–140)
HCT VFR BLD AUTO: 40.9 % (ref 36.5–49.3)
HDLC SERPL-MCNC: 41 MG/DL
HGB BLD-MCNC: 13.6 G/DL (ref 12–17)
IMM GRANULOCYTES # BLD AUTO: 0.05 THOUSAND/UL (ref 0–0.2)
IMM GRANULOCYTES NFR BLD AUTO: 1 % (ref 0–2)
LDLC SERPL CALC-MCNC: 81 MG/DL (ref 0–100)
LYMPHOCYTES # BLD AUTO: 2.52 THOUSANDS/ÂΜL (ref 0.6–4.47)
LYMPHOCYTES NFR BLD AUTO: 27 % (ref 14–44)
MCH RBC QN AUTO: 30.8 PG (ref 26.8–34.3)
MCHC RBC AUTO-ENTMCNC: 33.3 G/DL (ref 31.4–37.4)
MCV RBC AUTO: 93 FL (ref 82–98)
MONOCYTES # BLD AUTO: 0.88 THOUSAND/ÂΜL (ref 0.17–1.22)
MONOCYTES NFR BLD AUTO: 10 % (ref 4–12)
NEUTROPHILS # BLD AUTO: 5.53 THOUSANDS/ÂΜL (ref 1.85–7.62)
NEUTS SEG NFR BLD AUTO: 59 % (ref 43–75)
NONHDLC SERPL-MCNC: 103 MG/DL
NRBC BLD AUTO-RTO: 0 /100 WBCS
PLATELET # BLD AUTO: 286 THOUSANDS/UL (ref 149–390)
PMV BLD AUTO: 9.1 FL (ref 8.9–12.7)
POTASSIUM SERPL-SCNC: 4.2 MMOL/L (ref 3.5–5.3)
PROT SERPL-MCNC: 7.5 G/DL (ref 6.4–8.4)
PSA FREE MFR SERPL: 20.44 %
PSA FREE SERPL-MCNC: 0.17 NG/ML
PSA SERPL-MCNC: 0.81 NG/ML (ref 0–4)
RBC # BLD AUTO: 4.41 MILLION/UL (ref 3.88–5.62)
SODIUM SERPL-SCNC: 138 MMOL/L (ref 135–147)
TRIGL SERPL-MCNC: 111 MG/DL (ref ?–150)
TSH SERPL DL<=0.05 MIU/L-ACNC: 1.83 UIU/ML (ref 0.45–4.5)
WBC # BLD AUTO: 9.26 THOUSAND/UL (ref 4.31–10.16)

## 2025-06-27 PROCEDURE — 80061 LIPID PANEL: CPT

## 2025-06-27 PROCEDURE — 85025 COMPLETE CBC W/AUTO DIFF WBC: CPT

## 2025-06-27 PROCEDURE — 86140 C-REACTIVE PROTEIN: CPT

## 2025-06-27 PROCEDURE — 36415 COLL VENOUS BLD VENIPUNCTURE: CPT

## 2025-06-27 PROCEDURE — 80053 COMPREHEN METABOLIC PANEL: CPT

## 2025-06-27 PROCEDURE — 84154 ASSAY OF PSA FREE: CPT

## 2025-06-27 PROCEDURE — 84153 ASSAY OF PSA TOTAL: CPT

## 2025-06-27 PROCEDURE — 84443 ASSAY THYROID STIM HORMONE: CPT
